# Patient Record
Sex: MALE | Race: WHITE | URBAN - NONMETROPOLITAN AREA
[De-identification: names, ages, dates, MRNs, and addresses within clinical notes are randomized per-mention and may not be internally consistent; named-entity substitution may affect disease eponyms.]

---
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
## 2023-01-09 ENCOUNTER — APPOINTMENT (RX ONLY)
Dept: URBAN - NONMETROPOLITAN AREA CLINIC 9 | Facility: CLINIC | Age: 18
Setting detail: DERMATOLOGY
End: 2023-01-09

## 2023-01-09 DIAGNOSIS — D485 NEOPLASM OF UNCERTAIN BEHAVIOR OF SKIN: ICD-10-CM

## 2023-01-09 PROBLEM — D48.5 NEOPLASM OF UNCERTAIN BEHAVIOR OF SKIN: Status: ACTIVE | Noted: 2023-01-09

## 2023-01-09 PROCEDURE — ? PUNCH EXCISION

## 2023-01-09 PROCEDURE — 11400 EXC TR-EXT B9+MARG 0.5 CM<: CPT

## 2023-01-09 ASSESSMENT — LOCATION ZONE DERM: LOCATION ZONE: TRUNK

## 2023-01-09 ASSESSMENT — LOCATION DETAILED DESCRIPTION DERM: LOCATION DETAILED: LEFT SUPERIOR MEDIAL LOWER BACK

## 2023-01-09 ASSESSMENT — LOCATION SIMPLE DESCRIPTION DERM: LOCATION SIMPLE: LEFT LOWER BACK

## 2023-01-09 NOTE — PROCEDURE: PUNCH EXCISION
Medical Necessity Clause: This procedure was medically necessary because the lesion that was treated was:
Detail Level: Detailed
Size Of Lesion (*Required): 0.4
X Size Of Lesion Width In Cm (Optional): 0
Punch Size In Mm: 4
Repair Type: None (Simple)
Complex Requirements: Extensive Undermining Performed?: No
Undermining Type: Entire Wound
Debridement Text: The wound edges were debrided prior to proceeding with the closure to facilitate wound healing.
Helical Rim Text: The closure involved the helical rim.
Vermilion Border Text: The closure involved the vermilion border.
Nostril Rim Text: The closure involved the nostril rim.
Retention Suture Text: Retention sutures were placed to support the closure and prevent dehiscence.
Include Undermining Statement In The Repair Note?: Yes
Anesthesia Type: 1% lidocaine with 1:100,000 epinephrine and a 1:10 solution of 8.4% sodium bicarbonate
Anesthesia Volume In Cc: 1
Hemostasis: Pressure
Epidermal Sutures: 4-0 Nylon
Epidermal Closure: simple interrupted
Wound Care: Petrolatum
Wound Dressings: a dressing with hypoallergenic tape
Suture Removal: 7 days
Lab: [id number]
Lab Facility: [id number]
Path Notes (To The Dermatopathologist): Please check margins.
1.5 Mm Punch Excision Text: A 1.5 mm punch was used to excise the lesion to the level of the subcutaneous fat.  Blunt dissection was used to free the lesion from the surrounding tissues and the lesion was removed.
2 Mm Punch Excision Text: A 2 mm punch was used to excise the lesion to the level of the subcutaneous fat.  Blunt dissection was used to free the lesion from the surrounding tissues and the lesion was removed.
2.5 Mm Punch Excision Text: A 2.5 mm punch was used to excise the lesion to the level of the subcutaneous fat.  Blunt dissection was used to free the lesion from the surrounding tissues and the lesion was removed.
3 Mm Punch Excision Text: A 3 mm punch was used to excise the lesion to the level of the subcutaneous fat.  Blunt dissection was used to free the lesion from the surrounding tissues and the lesion was removed.
3.5 Mm Punch Excision Text: A 3.5 mm punch was used to excise the lesion to the level of the subcutaneous fat.  Blunt dissection was used to free the lesion from the surrounding tissues and the lesion was removed.
4 Mm Punch Excision Text: A 4 mm punch was used to excise the lesion to the level of the subcutaneous fat.  Blunt dissection was used to free the lesion from the surrounding tissues and the lesion was removed.
4.5 Mm Punch Excision Text: A 4.5 mm punch was used to excise the lesion to the level of the subcutaneous fat.  Blunt dissection was used to free the lesion from the surrounding tissues and the lesion was removed.
5 Mm Punch Excision Text: A 5 mm punch was used to excise the lesion to the level of the subcutaneous fat.  Blunt dissection was used to free the lesion from the surrounding tissues and the lesion was removed.
6 Mm Punch Excision Text: A 6 mm punch was used to excise the lesion to the level of the subcutaneous fat.  Blunt dissection was used to free the lesion from the surrounding tissues and the lesion was removed.
7 Mm Punch Excision Text: A 7 mm punch was used to excise the lesion to the level of the subcutaneous fat.  Blunt dissection was used to free the lesion from the surrounding tissues and the lesion was removed.
8 Mm Punch Excision Text: A 8 mm punch was used to excise the lesion to the level of the subcutaneous fat.  Blunt dissection was used to free the lesion from the surrounding tissues and the lesion was removed.
10 Mm Punch Excision Text: A 10 mm punch was used to excise the lesion to the level of the subcutaneous fat.  Blunt dissection was used to free the lesion from the surrounding tissues and the lesion was removed.
12 Mm Punch Excision Text: A 12 mm punch was used to excise the lesion to the level of the subcutaneous fat.  Blunt dissection was used to free the lesion from the surrounding tissues and the lesion was removed.
Consent was obtained from the patient. The risks and benefits to therapy were discussed in detail. Specifically, the risks of infection, scarring, bleeding, prolonged wound healing, incomplete removal, allergy to anesthesia, nerve injury and recurrence were addressed. Prior to the procedure, the treatment site was clearly identified and confirmed by the patient. All components of Universal Protocol/PAUSE Rule completed.
Post-Care Instructions: I reviewed with the patient in detail post-care instructions. Patient is to cleanse the area twice daily with hydrogen peroxide and/or diluted vinegar, then apply Vaseline and coverage with bandage.
Notification Instructions: Patient will return to clinic for path results.
Billing Type: Third-Party Bill

## 2023-01-16 ENCOUNTER — APPOINTMENT (RX ONLY)
Dept: URBAN - NONMETROPOLITAN AREA CLINIC 9 | Facility: CLINIC | Age: 18
Setting detail: DERMATOLOGY
End: 2023-01-16

## 2023-01-16 DIAGNOSIS — L70.0 ACNE VULGARIS: ICD-10-CM

## 2023-01-16 DIAGNOSIS — Z48.817 ENCOUNTER FOR SURGICAL AFTERCARE FOLLOWING SURGERY ON THE SKIN AND SUBCUTANEOUS TISSUE: ICD-10-CM

## 2023-01-16 PROCEDURE — ? SEPARATE AND IDENTIFIABLE DOCUMENTATION

## 2023-01-16 PROCEDURE — ? COUNSELING

## 2023-01-16 PROCEDURE — ? INVENTORY

## 2023-01-16 PROCEDURE — 99024 POSTOP FOLLOW-UP VISIT: CPT

## 2023-01-16 PROCEDURE — ? IN-HOUSE DISPENSING PHARMACY

## 2023-01-16 PROCEDURE — ? POST-OP WOUND EVALUATION

## 2023-01-16 PROCEDURE — 99213 OFFICE O/P EST LOW 20 MIN: CPT | Mod: 24

## 2023-01-16 PROCEDURE — ? PRESCRIPTION

## 2023-01-16 PROCEDURE — ? TREATMENT REGIMEN

## 2023-01-16 RX ORDER — MINOCYCLINE HYDROCHLORIDE 100 MG/1
CAPSULE ORAL
Qty: 60 | Refills: 1 | Status: ERX

## 2023-01-16 ASSESSMENT — LOCATION SIMPLE DESCRIPTION DERM
LOCATION SIMPLE: LEFT SHOULDER
LOCATION SIMPLE: RIGHT SHOULDER
LOCATION SIMPLE: RIGHT CHEEK
LOCATION SIMPLE: LEFT CHEEK
LOCATION SIMPLE: LEFT LOWER BACK
LOCATION SIMPLE: CHIN

## 2023-01-16 ASSESSMENT — LOCATION DETAILED DESCRIPTION DERM
LOCATION DETAILED: RIGHT CENTRAL MALAR CHEEK
LOCATION DETAILED: LEFT LATERAL MALAR CHEEK
LOCATION DETAILED: LEFT CHIN
LOCATION DETAILED: LEFT ANTERIOR SHOULDER
LOCATION DETAILED: RIGHT ANTERIOR SHOULDER
LOCATION DETAILED: LEFT SUPERIOR MEDIAL LOWER BACK

## 2023-01-16 ASSESSMENT — LOCATION ZONE DERM
LOCATION ZONE: FACE
LOCATION ZONE: ARM
LOCATION ZONE: TRUNK

## 2023-01-16 NOTE — PROCEDURE: COUNSELING
Bactrim Pregnancy And Lactation Text: This medication is Pregnancy Category D and is known to cause fetal risk.  It is also excreted in breast milk.
Erythromycin Counseling:  I discussed with the patient the risks of erythromycin including but not limited to GI upset, allergic reaction, drug rash, diarrhea, increase in liver enzymes, and yeast infections.
Aklief counseling:  Patient advised to apply a pea-sized amount only at bedtime and wait 30 minutes after washing their face before applying.  If too drying, patient may add a non-comedogenic moisturizer.  The most commonly reported side effects including irritation, redness, scaling, dryness, stinging, burning, itching, and increased risk of sunburn.  The patient verbalized understanding of the proper use and possible adverse effects of retinoids.  All of the patient's questions and concerns were addressed.
Winlevi Counseling:  I discussed with the patient the risks of topical clascoterone including but not limited to erythema, scaling, itching, and stinging. Patient voiced their understanding.
Topical Retinoid Pregnancy And Lactation Text: This medication is Pregnancy Category C. It is unknown if this medication is excreted in breast milk.
Tazorac Pregnancy And Lactation Text: This medication is not safe during pregnancy. It is unknown if this medication is excreted in breast milk.
Include Pregnancy/Lactation Warning?: No
Birth Control Pills Pregnancy And Lactation Text: This medication should be avoided if pregnant and for the first 30 days post-partum.
Isotretinoin Counseling: Patient should get monthly blood tests, not donate blood, not drive at night if vision affected, not share medication, and not undergo elective surgery for 6 months after tx completed. Side effects reviewed, pt to contact office should one occur.
Sarecycline Pregnancy And Lactation Text: This medication is Pregnancy Category D and not consider safe during pregnancy. It is also excreted in breast milk.
Azelaic Acid Counseling: Patient counseled that medicine may cause skin irritation and to avoid applying near the eyes.  In the event of skin irritation, the patient was advised to reduce the amount of the drug applied or use it less frequently.   The patient verbalized understanding of the proper use and possible adverse effects of azelaic acid.  All of the patient's questions and concerns were addressed.
Isotretinoin Pregnancy And Lactation Text: This medication is Pregnancy Category X and is considered extremely dangerous during pregnancy. It is unknown if it is excreted in breast milk.
Spironolactone Counseling: Patient advised regarding risks of diarrhea, abdominal pain, hyperkalemia, birth defects (for female patients), liver toxicity and renal toxicity. The patient may need blood work to monitor liver and kidney function and potassium levels while on therapy. The patient verbalized understanding of the proper use and possible adverse effects of spironolactone.  All of the patient's questions and concerns were addressed.
Azelaic Acid Pregnancy And Lactation Text: This medication is considered safe during pregnancy and breast feeding.
Topical Clindamycin Counseling: Patient counseled that this medication may cause skin irritation or allergic reactions.  In the event of skin irritation, the patient was advised to reduce the amount of the drug applied or use it less frequently.   The patient verbalized understanding of the proper use and possible adverse effects of clindamycin.  All of the patient's questions and concerns were addressed.
Dapsone Counseling: I discussed with the patient the risks of dapsone including but not limited to hemolytic anemia, agranulocytosis, rashes, methemoglobinemia, kidney failure, peripheral neuropathy, headaches, GI upset, and liver toxicity.  Patients who start dapsone require monitoring including baseline LFTs and weekly CBCs for the first month, then every month thereafter.  The patient verbalized understanding of the proper use and possible adverse effects of dapsone.  All of the patient's questions and concerns were addressed.
Topical Sulfur Applications Counseling: Topical Sulfur Counseling: Patient counseled that this medication may cause skin irritation or allergic reactions.  In the event of skin irritation, the patient was advised to reduce the amount of the drug applied or use it less frequently.   The patient verbalized understanding of the proper use and possible adverse effects of topical sulfur application.  All of the patient's questions and concerns were addressed.
Benzoyl Peroxide Pregnancy And Lactation Text: This medication is Pregnancy Category C. It is unknown if benzoyl peroxide is excreted in breast milk.
Azithromycin Pregnancy And Lactation Text: This medication is considered safe during pregnancy and is also secreted in breast milk.
Doxycycline Counseling:  Patient counseled regarding possible photosensitivity and increased risk for sunburn.  Patient instructed to avoid sunlight, if possible.  When exposed to sunlight, patients should wear protective clothing, sunglasses, and sunscreen.  The patient was instructed to call the office immediately if the following severe adverse effects occur:  hearing changes, easy bruising/bleeding, severe headache, or vision changes.  The patient verbalized understanding of the proper use and possible adverse effects of doxycycline.  All of the patient's questions and concerns were addressed.
High Dose Vitamin A Pregnancy And Lactation Text: High dose vitamin A therapy is contraindicated during pregnancy and breast feeding.
Tetracycline Counseling: Patient counseled regarding possible photosensitivity and increased risk for sunburn.  Patient instructed to avoid sunlight, if possible.  When exposed to sunlight, patients should wear protective clothing, sunglasses, and sunscreen.  The patient was instructed to call the office immediately if the following severe adverse effects occur:  hearing changes, easy bruising/bleeding, severe headache, or vision changes.  The patient verbalized understanding of the proper use and possible adverse effects of tetracycline.  All of the patient's questions and concerns were addressed. Patient understands to avoid pregnancy while on therapy due to potential birth defects.
Erythromycin Pregnancy And Lactation Text: This medication is Pregnancy Category B and is considered safe during pregnancy. It is also excreted in breast milk.
Sarecycline Counseling: Patient advised regarding possible photosensitivity and discoloration of the teeth, skin, lips, tongue and gums.  Patient instructed to avoid sunlight, if possible.  When exposed to sunlight, patients should wear protective clothing, sunglasses, and sunscreen.  The patient was instructed to call the office immediately if the following severe adverse effects occur:  hearing changes, easy bruising/bleeding, severe headache, or vision changes.  The patient verbalized understanding of the proper use and possible adverse effects of sarecycline.  All of the patient's questions and concerns were addressed.
Aklief Pregnancy And Lactation Text: It is unknown if this medication is safe to use during pregnancy.  It is unknown if this medication is excreted in breast milk.  Breastfeeding women should use the topical cream on the smallest area of the skin for the shortest time needed while breastfeeding.  Do not apply to nipple and areola.
Tazorac Counseling:  Patient advised that medication is irritating and drying.  Patient may need to apply sparingly and wash off after an hour before eventually leaving it on overnight.  The patient verbalized understanding of the proper use and possible adverse effects of tazorac.  All of the patient's questions and concerns were addressed.
Doxycycline Pregnancy And Lactation Text: This medication is Pregnancy Category D and not consider safe during pregnancy. It is also excreted in breast milk but is considered safe for shorter treatment courses.
Bactrim Counseling:  I discussed with the patient the risks of sulfa antibiotics including but not limited to GI upset, allergic reaction, drug rash, diarrhea, dizziness, photosensitivity, and yeast infections.  Rarely, more serious reactions can occur including but not limited to aplastic anemia, agranulocytosis, methemoglobinemia, blood dyscrasias, liver or kidney failure, lung infiltrates or desquamative/blistering drug rashes.
Minocycline Counseling: Patient advised regarding possible photosensitivity and discoloration of the teeth, skin, lips, tongue and gums.  Patient instructed to avoid sunlight, if possible.  When exposed to sunlight, patients should wear protective clothing, sunglasses, and sunscreen.  The patient was instructed to call the office immediately if the following severe adverse effects occur:  hearing changes, easy bruising/bleeding, severe headache, or vision changes.  The patient verbalized understanding of the proper use and possible adverse effects of minocycline.  All of the patient's questions and concerns were addressed.
Topical Retinoid counseling:  Patient advised to apply a pea-sized amount only at bedtime and wait 30 minutes after washing their face before applying.  If too drying, patient may add a non-comedogenic moisturizer. The patient verbalized understanding of the proper use and possible adverse effects of retinoids.  All of the patient's questions and concerns were addressed.
Birth Control Pills Counseling: Birth Control Pill Counseling: I discussed with the patient the potential side effects of OCPs including but not limited to increased risk of stroke, heart attack, thrombophlebitis, deep venous thrombosis, hepatic adenomas, breast changes, GI upset, headaches, and depression.  The patient verbalized understanding of the proper use and possible adverse effects of OCPs. All of the patient's questions and concerns were addressed.
Winlevi Pregnancy And Lactation Text: This medication is considered safe during pregnancy and breastfeeding.
Azithromycin Counseling:  I discussed with the patient the risks of azithromycin including but not limited to GI upset, allergic reaction, drug rash, diarrhea, and yeast infections.
Dapsone Pregnancy And Lactation Text: This medication is Pregnancy Category C and is not considered safe during pregnancy or breast feeding.
High Dose Vitamin A Counseling: Side effects reviewed, pt to contact office should one occur.
Spironolactone Pregnancy And Lactation Text: This medication can cause feminization of the male fetus and should be avoided during pregnancy. The active metabolite is also found in breast milk.
Topical Sulfur Applications Pregnancy And Lactation Text: This medication is Pregnancy Category C and has an unknown safety profile during pregnancy. It is unknown if this topical medication is excreted in breast milk.
Topical Clindamycin Pregnancy And Lactation Text: This medication is Pregnancy Category B and is considered safe during pregnancy. It is unknown if it is excreted in breast milk.
Benzoyl Peroxide Counseling: Patient counseled that medicine may cause skin irritation and bleach clothing.  In the event of skin irritation, the patient was advised to reduce the amount of the drug applied or use it less frequently.   The patient verbalized understanding of the proper use and possible adverse effects of benzoyl peroxide.  All of the patient's questions and concerns were addressed.
Detail Level: Detailed

## 2023-01-16 NOTE — PROCEDURE: POST-OP WOUND EVALUATION
Detail Level: Detailed
Add 26319 Cpt? (Important Note: In 2017 The Use Of 19575 Is Being Tracked By Cms To Determine Future Global Period Reimbursement For Global Periods): yes
Wound Diameter In Cm(Optional): 0
Wound Crusting?: clean
Sutures?: intact
Wound Edema?: mild
Wound Color?: pink
Lymphadenopathy?: absent
Any New Or Residual Neoplasm?: No
Follow Up Units (Optional): 4
Follow Up Time Frame (Optional): months

## 2023-01-16 NOTE — PROCEDURE: IN-HOUSE DISPENSING PHARMACY
Product 26 Units Dispensed: 0
Name Of Product 41: Alopecia Topical Solution for Men HP\\n(Minoxidil 7%, Finasteride 0.1%)
Product 78 Unit Type: mg
Product 3 Amount/Unit (Numbers Only): 30
Product 26 Application Directions: Apply sparingly to all affected areas as directed 1-2 times per day.
Product 22 Price/Unit (In Dollars): 50
Product 24 Application Directions: Patient is to mix with 14 ounces of Skin MD.\\nApply sparingly topically to the affected areas 5-10 times per week, taper as directed.
Product 41 Application Directions: Apply topically to the affected areas 1 or 2 times daily or as directed.
Detail Level: Zone
Product 12 Amount/Unit (Numbers Only): 15
Name Of Product 31: Wart Corn and Callus Cream\\n(Cimetidine 10%, Lidocaine 5%, Salicylic Acid 40%)
Product 26 Unit Type: grams
Send Charges To Patient Encounter: No
Product 22 Amount/Unit (Numbers Only): 60
Name Of Product 25: Dermatitis Foam\\n(Clobetasol Propionate 0.05%, Calcipotriene 0.005%)
Name Of Product 11: Anti-Fungal Cream\\n(Ciclopirox 3%, Itraconazole 5%, Urea 20%, DMSO 5%)
Product 31 Application Directions: Apply topically to the affected areas 1-2 times daily as directed.
Name Of Product 23: Dermatitis Topical Solution\\n(Clobetasol Propionate 0.05%, Niacinamide 4%
Product 2 Amount/Unit (Numbers Only): 120
Product 25 Application Directions: Apply sparingly topically to the affected areas 1-2 times daily, taper as directed.
Product 23 Application Directions: Apply sparingly topically to the affected areas 3 times per week, taper as directed.
Name Of Product 26: Psoriasis Cream\\n(Calcipotriene 0.005%, Niacinamide 4%)
Name Of Product 24: Dermatitis Topical Solution\\n(Clobetasol Propionate 0.05%, Niacinamide 4%)
Product 12 Application Directions: Apply topically to the affected nail(s) and surrounding areas twice daily as directed.
Name Of Product 22: Dermatitis Cream\\n(Clobetasol Propionate 0.05%, Niacinamide 4%)
Product 3 Application Directions: Apply sparingly topically to the affected areas 1-2 times daily as directed.
Name Of Product 2: Acne Body Wash\\n(Salicylic Acid 5%, Sodium Sulfacetamide Monohydrate 10%, Sulfur Precipitated 2.75%)
Product 22 Application Directions: Apply sparingly topically to the affected areas 1-2 times a day M-F, taper as directed.
Render Refills If Set To 0: Yes
Product 2 Application Directions: Wash affected areas daily or as directed.
Name Of Product 21: Dermatitis Cream\\n(Fluocinolone Acetonide 0.01%, Niacinamide 4%)
Name Of Product 32: Actinic Keratosis Gel / Wart Gel\\n(Imiquimod 5%, Niacinamide 2%, Levocetirizine Dihydrochloride 1%)
Name Of Product 1: Acne Gel\\n(Adapalene 0.3%, Benzoyl Peroxide 2.5%, Niacinamide 4%)
Product 1 Units Dispensed: 1
Name Of Product 3: Rosacea Silicone Gel\\n(Metronidazole 1%, Ivermectin 1%, Potassium Azeloyl Diglycinate 5%, Niacinamide 4%)
Name Of Product 12: Anti-Fungal Nail Solution\\n(Fluconazole 4%, Ibuprofen 2%, Itraconazole 1%, Terbinafine HCL 4%)
Product 32 Application Directions: Apply sparingly topically to the affected areas daily as directed.
Product 1 Application Directions: Apply sparingly topically to acne affected zones daily as directed.

## 2023-02-27 ENCOUNTER — APPOINTMENT (RX ONLY)
Dept: URBAN - NONMETROPOLITAN AREA CLINIC 9 | Facility: CLINIC | Age: 18
Setting detail: DERMATOLOGY
End: 2023-02-27

## 2023-02-27 DIAGNOSIS — L70.0 ACNE VULGARIS: ICD-10-CM

## 2023-02-27 PROCEDURE — 99213 OFFICE O/P EST LOW 20 MIN: CPT

## 2023-02-27 PROCEDURE — ? INVENTORY

## 2023-02-27 PROCEDURE — ? PRESCRIPTION

## 2023-02-27 PROCEDURE — ? IN-HOUSE DISPENSING PHARMACY

## 2023-02-27 PROCEDURE — ? COUNSELING

## 2023-02-27 PROCEDURE — ? TREATMENT REGIMEN

## 2023-02-27 RX ORDER — MINOCYCLINE HYDROCHLORIDE 100 MG/1
CAPSULE ORAL
Qty: 60 | Refills: 3 | Status: ERX

## 2023-02-27 ASSESSMENT — LOCATION DETAILED DESCRIPTION DERM
LOCATION DETAILED: RIGHT ANTERIOR SHOULDER
LOCATION DETAILED: LEFT ANTERIOR SHOULDER
LOCATION DETAILED: LEFT LATERAL MALAR CHEEK
LOCATION DETAILED: RIGHT CENTRAL MALAR CHEEK
LOCATION DETAILED: LEFT CHIN

## 2023-02-27 ASSESSMENT — LOCATION SIMPLE DESCRIPTION DERM
LOCATION SIMPLE: RIGHT CHEEK
LOCATION SIMPLE: LEFT CHEEK
LOCATION SIMPLE: CHIN
LOCATION SIMPLE: LEFT SHOULDER
LOCATION SIMPLE: RIGHT SHOULDER

## 2023-02-27 ASSESSMENT — LOCATION ZONE DERM
LOCATION ZONE: ARM
LOCATION ZONE: FACE

## 2023-02-27 NOTE — PROCEDURE: IN-HOUSE DISPENSING PHARMACY
Product 26 Units Dispensed: 0
Name Of Product 41: Alopecia Topical Solution for Men HP\\n(Minoxidil 7%, Finasteride 0.1%)
Product 78 Unit Type: mg
Product 3 Amount/Unit (Numbers Only): 30
Product 26 Application Directions: Apply sparingly to all affected areas as directed 1-2 times per day.
Product 22 Price/Unit (In Dollars): 50
Product 24 Application Directions: Patient is to mix with 14 ounces of Skin MD.\\nApply sparingly topically to the affected areas 5-10 times per week, taper as directed.
Product 41 Application Directions: Apply topically to the affected areas 1 or 2 times daily or as directed.
Detail Level: Zone
Product 12 Amount/Unit (Numbers Only): 15
Name Of Product 31: Wart Corn and Callus Cream\\n(Cimetidine 10%, Lidocaine 5%, Salicylic Acid 40%)
Product 26 Unit Type: grams
Send Charges To Patient Encounter: No
Product 22 Amount/Unit (Numbers Only): 60
Name Of Product 25: Dermatitis Foam\\n(Clobetasol Propionate 0.05%, Calcipotriene 0.005%)
Name Of Product 11: Anti-Fungal Cream\\n(Ciclopirox 3%, Itraconazole 5%, Urea 20%, DMSO 5%)
Product 31 Application Directions: Apply topically to the affected areas 1-2 times daily as directed.
Name Of Product 23: Dermatitis Topical Solution\\n(Clobetasol Propionate 0.05%, Niacinamide 4%
Product 2 Amount/Unit (Numbers Only): 120
Product 25 Application Directions: Apply sparingly topically to the affected areas 1-2 times daily, taper as directed.
Product 23 Application Directions: Apply sparingly topically to the affected areas 3 times per week, taper as directed.
Name Of Product 26: Psoriasis Cream\\n(Calcipotriene 0.005%, Niacinamide 4%)
Name Of Product 24: Dermatitis Topical Solution\\n(Clobetasol Propionate 0.05%, Niacinamide 4%)
Product 12 Application Directions: Apply topically to the affected nail(s) and surrounding areas twice daily as directed.
Name Of Product 22: Dermatitis Cream\\n(Clobetasol Propionate 0.05%, Niacinamide 4%)
Product 3 Application Directions: Apply sparingly topically to the affected areas 1-2 times daily as directed.
Name Of Product 2: Acne Body Wash\\n(Salicylic Acid 5%, Sodium Sulfacetamide Monohydrate 10%, Sulfur Precipitated 2.75%)
Product 2 Units Dispensed: 1
Product 22 Application Directions: Apply sparingly topically to the affected areas 1-2 times a day M-F, taper as directed.
Render Refills If Set To 0: Yes
Product 2 Application Directions: Wash affected areas daily or as directed.
Name Of Product 21: Dermatitis Cream\\n(Fluocinolone Acetonide 0.01%, Niacinamide 4%)
Name Of Product 32: Actinic Keratosis Gel / Wart Gel\\n(Imiquimod 5%, Niacinamide 2%, Levocetirizine Dihydrochloride 1%)
Name Of Product 1: Acne Gel\\n(Adapalene 0.3%, Benzoyl Peroxide 2.5%, Niacinamide 4%)
Name Of Product 3: Rosacea Silicone Gel\\n(Metronidazole 1%, Ivermectin 1%, Potassium Azeloyl Diglycinate 5%, Niacinamide 4%)
Name Of Product 12: Anti-Fungal Nail Solution\\n(Fluconazole 4%, Ibuprofen 2%, Itraconazole 1%, Terbinafine HCL 4%)
Product 32 Application Directions: Apply sparingly topically to the affected areas daily as directed.
Product 1 Application Directions: Apply sparingly topically to acne affected zones daily as directed.

## 2023-04-18 ENCOUNTER — APPOINTMENT (RX ONLY)
Dept: URBAN - NONMETROPOLITAN AREA CLINIC 9 | Facility: CLINIC | Age: 18
Setting detail: DERMATOLOGY
End: 2023-04-18

## 2023-04-18 DIAGNOSIS — L70.0 ACNE VULGARIS: ICD-10-CM

## 2023-04-18 PROCEDURE — ? IN-HOUSE DISPENSING PHARMACY

## 2023-04-18 PROCEDURE — ? INVENTORY

## 2023-04-18 NOTE — PROCEDURE: IN-HOUSE DISPENSING PHARMACY
Product 71 Units Dispensed: 0
Product 75 Unit Type: mg
Product 26 Unit Type: grams
Render Refills If Set To 0: Yes
Product 24 Amount/Unit (Numbers Only): 60
Product 3 Price/Unit (In Dollars): 50
Product 22 Application Directions: Apply sparingly topically to the affected areas 1-2 times a day M-F, taper as directed.
Name Of Product 1: Acne Gel\\n(Adapalene 0.3%, Benzoyl Peroxide 2.5%, Niacinamide 4%)
Product 12 Amount/Unit (Numbers Only): 15
Product 3 Amount/Unit (Numbers Only): 30
Product 1 Application Directions: Apply sparingly topically to acne affected zones daily as directed.
Name Of Product 23: Dermatitis Topical Solution\\n(Clobetasol Propionate 0.05%, Niacinamide 4%
Name Of Product 21: Dermatitis Cream\\n(Fluocinolone Acetonide 0.01%, Niacinamide 4%)
Name Of Product 41: Alopecia Topical Solution for Men HP\\n(Minoxidil 7%, Finasteride 0.1%)
Name Of Product 32: Actinic Keratosis Gel / Wart Gel\\n(Imiquimod 5%, Niacinamide 2%, Levocetirizine Dihydrochloride 1%)
Name Of Product 26: Psoriasis Cream\\n(Calcipotriene 0.005%, Niacinamide 4%)
Detail Level: Zone
Send Charges To Patient Encounter: No
Product 41 Application Directions: Apply topically to the affected areas 1 or 2 times daily or as directed.
Product 32 Application Directions: Apply sparingly topically to the affected areas daily as directed.
Product 2 Amount/Unit (Numbers Only): 120
Name Of Product 31: Wart Corn and Callus Cream\\n(Cimetidine 10%, Lidocaine 5%, Salicylic Acid 40%)
Product 26 Application Directions: Apply sparingly to all affected areas as directed 1-2 times per day.
Name Of Product 22: Dermatitis Cream\\n(Clobetasol Propionate 0.05%, Niacinamide 4%)
Product 24 Application Directions: Patient is to mix with 14 ounces of Skin MD.\\nApply sparingly topically to the affected areas 5-10 times per week, taper as directed.
Name Of Product 12: Anti-Fungal Nail Solution\\n(Fluconazole 4%, Ibuprofen 2%, Itraconazole 1%, Terbinafine HCL 4%)
Name Of Product 3: Rosacea Silicone Gel\\n(Metronidazole 1%, Ivermectin 1%, Potassium Azeloyl Diglycinate 5%, Niacinamide 4%)
Product 12 Application Directions: Apply topically to the affected nail(s) and surrounding areas twice daily as directed.
Name Of Product 25: Dermatitis Foam\\n(Clobetasol Propionate 0.05%, Calcipotriene 0.005%)
Product 3 Application Directions: Apply sparingly topically to the affected areas 1-2 times daily as directed.
Product 31 Application Directions: Apply topically to the affected areas 1-2 times daily as directed.
Product 25 Application Directions: Apply sparingly topically to the affected areas 1-2 times daily, taper as directed.
Name Of Product 11: Anti-Fungal Cream\\n(Ciclopirox 3%, Itraconazole 5%, Urea 20%, DMSO 5%)
Product 23 Application Directions: Apply sparingly topically to the affected areas 3 times per week, taper as directed.
Name Of Product 2: Acne Body Wash\\n(Salicylic Acid 5%, Sodium Sulfacetamide Monohydrate 10%, Sulfur Precipitated 2.75%)
Product 2 Units Dispensed: 1
Name Of Product 24: Dermatitis Topical Solution\\n(Clobetasol Propionate 0.05%, Niacinamide 4%)
Product 2 Application Directions: Wash affected areas daily or as directed.

## 2023-07-14 ENCOUNTER — APPOINTMENT (RX ONLY)
Dept: URBAN - NONMETROPOLITAN AREA CLINIC 9 | Facility: CLINIC | Age: 18
Setting detail: DERMATOLOGY
End: 2023-07-14

## 2023-07-14 DIAGNOSIS — L70.0 ACNE VULGARIS: ICD-10-CM

## 2023-07-14 PROCEDURE — ? TREATMENT REGIMEN

## 2023-07-14 PROCEDURE — ? PRESCRIPTION

## 2023-07-14 PROCEDURE — 99213 OFFICE O/P EST LOW 20 MIN: CPT

## 2023-07-14 PROCEDURE — ? INVENTORY

## 2023-07-14 PROCEDURE — ? IN-HOUSE DISPENSING PHARMACY

## 2023-07-14 PROCEDURE — ? COUNSELING

## 2023-07-14 RX ORDER — MINOCYCLINE HYDROCHLORIDE 100 MG/1
CAPSULE ORAL
Qty: 60 | Refills: 5 | Status: ERX

## 2023-07-14 ASSESSMENT — LOCATION SIMPLE DESCRIPTION DERM
LOCATION SIMPLE: RIGHT SHOULDER
LOCATION SIMPLE: RIGHT CHEEK
LOCATION SIMPLE: LEFT CHEEK
LOCATION SIMPLE: CHIN
LOCATION SIMPLE: LEFT SHOULDER

## 2023-07-14 ASSESSMENT — LOCATION ZONE DERM
LOCATION ZONE: FACE
LOCATION ZONE: ARM

## 2023-07-14 ASSESSMENT — LOCATION DETAILED DESCRIPTION DERM
LOCATION DETAILED: RIGHT ANTERIOR SHOULDER
LOCATION DETAILED: LEFT CHIN
LOCATION DETAILED: RIGHT CENTRAL MALAR CHEEK
LOCATION DETAILED: LEFT ANTERIOR SHOULDER
LOCATION DETAILED: LEFT LATERAL MALAR CHEEK

## 2023-07-28 ENCOUNTER — APPOINTMENT (RX ONLY)
Dept: URBAN - NONMETROPOLITAN AREA CLINIC 9 | Facility: CLINIC | Age: 18
Setting detail: DERMATOLOGY
End: 2023-07-28

## 2023-07-28 DIAGNOSIS — L70.0 ACNE VULGARIS: ICD-10-CM

## 2023-07-28 PROCEDURE — ? IN-HOUSE DISPENSING PHARMACY

## 2023-07-28 PROCEDURE — ? INVENTORY

## 2023-07-28 NOTE — PROCEDURE: IN-HOUSE DISPENSING PHARMACY
Product 72 Refills: 0
Product 56 Unit Type: mg
Detail Level: Zone
Product 25 Application Directions: Apply sparingly topically to the affected areas 1-2 times daily, taper as directed.
Product 2 Price/Unit (In Dollars): 50
Product 31 Unit Type: grams
Name Of Product 21: Dermatitis Cream\\n(Fluocinolone Acetonide 0.01%, Niacinamide 4%)
Name Of Product 32: Actinic Keratosis Gel / Wart Gel\\n(Imiquimod 5%, Niacinamide 2%, Levocetirizine Dihydrochloride 1%)
Product 2 Amount/Unit (Numbers Only): 120
Send Charges To Patient Encounter: No
Product 11 Amount/Unit (Numbers Only): 30
Product 23 Amount/Unit (Numbers Only): 60
Product 21 Application Directions: Apply sparingly topically to the affected areas 1-2 times a day M-F, taper as directed.
Name Of Product 26: Psoriasis Cream\\n(Calcipotriene 0.005%, Niacinamide 4%)
Render Refills If Set To 0: Yes
Product 32 Application Directions: Apply sparingly topically to the affected areas daily as directed.
Name Of Product 1: Acne Gel\\n(Adapalene 0.3%, Benzoyl Peroxide 2.5%, Niacinamide 4%)
Product 26 Application Directions: Apply sparingly to all affected areas as directed 1-2 times per day.
Product 3 Application Directions: Apply sparingly topically to the affected areas 1-2 times daily as directed.
Product 24 Application Directions: Patient is to mix with 14 ounces of Skin MD.\\nApply sparingly topically to the affected areas 5-10 times per week, taper as directed.
Product 12 Application Directions: Apply topically to the affected nail(s) and surrounding areas twice daily as directed.
Name Of Product 31: Wart Corn and Callus Cream\\n(Cimetidine 10%, Lidocaine 5%, Salicylic Acid 40%)
Name Of Product 25: Dermatitis Foam\\n(Clobetasol Propionate 0.05%, Calcipotriene 0.005%)
Product 31 Application Directions: Apply topically to the affected areas 1-2 times daily as directed.
Product 2 Units Dispensed: 1
Product 23 Application Directions: Apply sparingly topically to the affected areas 3 times per week, taper as directed.
Product 2 Application Directions: Wash affected areas daily or as directed.
Name Of Product 41: Alopecia Topical Solution for Men HP\\n(Minoxidil 7%, Finasteride 0.1%)
Name Of Product 12: Anti-Fungal Nail Solution\\n(Fluconazole 4%, Ibuprofen 2%, Itraconazole 1%, Terbinafine HCL 4%)
Name Of Product 24: Dermatitis Topical Solution\\n(Clobetasol Propionate 0.05%, Niacinamide 4%)
Name Of Product 3: Rosacea Silicone Gel\\n(Metronidazole 1%, Ivermectin 1%, Potassium Azeloyl Diglycinate 5%, Niacinamide 4%)
Product 41 Application Directions: Apply topically to the affected areas 1 or 2 times daily or as directed.
Name Of Product 22: Dermatitis Cream\\n(Clobetasol Propionate 0.05%, Niacinamide 4%)
Product 12 Amount/Unit (Numbers Only): 15
Product 1 Application Directions: Apply sparingly topically to acne affected zones daily as directed.
Name Of Product 11: Anti-Fungal Cream\\n(Ciclopirox 3%, Itraconazole 5%, Urea 20%, DMSO 5%)
Name Of Product 23: Dermatitis Topical Solution\\n(Clobetasol Propionate 0.05%, Niacinamide 4%
Name Of Product 2: Acne Body Wash\\n(Salicylic Acid 5%, Sodium Sulfacetamide Monohydrate 10%, Sulfur Precipitated 2.75%)

## 2023-08-18 ENCOUNTER — APPOINTMENT (RX ONLY)
Dept: URBAN - NONMETROPOLITAN AREA CLINIC 9 | Facility: CLINIC | Age: 18
Setting detail: DERMATOLOGY
End: 2023-08-18

## 2023-08-18 DIAGNOSIS — L70.0 ACNE VULGARIS: ICD-10-CM

## 2023-08-18 PROCEDURE — ? IN-HOUSE DISPENSING PHARMACY

## 2023-08-18 PROCEDURE — ? INVENTORY

## 2023-08-18 NOTE — PROCEDURE: IN-HOUSE DISPENSING PHARMACY
Product 52 Unit Type: mg
Product 7 Price/Unit (In Dollars): 0
Product 41 Unit Type: grams
Name Of Product 25: Dermatitis Foam\\n(Clobetasol Propionate 0.05%, Calcipotriene 0.005%)
Name Of Product 2: Acne Body Wash\\n(Salicylic Acid 5%, Sodium Sulfacetamide Monohydrate 10%, Sulfur Precipitated 2.75%)
Product 32 Price/Unit (In Dollars): 50
Product 2 Units Dispensed: 1
Product 25 Application Directions: Apply sparingly topically to the affected areas 1-2 times daily, taper as directed.
Name Of Product 21: Dermatitis Cream\\n(Fluocinolone Acetonide 0.01%, Niacinamide 4%)
Product 2 Application Directions: Wash affected areas daily or as directed.
Product 32 Amount/Unit (Numbers Only): 30
Product 12 Amount/Unit (Numbers Only): 15
Product 23 Amount/Unit (Numbers Only): 60
Name Of Product 26: Psoriasis Cream\\n(Calcipotriene 0.005%, Niacinamide 4%)
Name Of Product 3: Rosacea Silicone Gel\\n(Metronidazole 1%, Ivermectin 1%, Potassium Azeloyl Diglycinate 5%, Niacinamide 4%)
Product 21 Application Directions: Apply sparingly topically to the affected areas 1-2 times a day M-F, taper as directed.
Name Of Product 24: Dermatitis Topical Solution\\n(Clobetasol Propionate 0.05%, Niacinamide 4%)
Name Of Product 1: Acne Gel\\n(Adapalene 0.3%, Benzoyl Peroxide 2.5%, Niacinamide 4%)
Name Of Product 41: Alopecia Topical Solution for Men HP\\n(Minoxidil 7%, Finasteride 0.1%)
Product 24 Application Directions: Patient is to mix with 14 ounces of Skin MD.\\nApply sparingly topically to the affected areas 5-10 times per week, taper as directed.
Product 1 Application Directions: Apply sparingly topically to acne affected zones daily as directed.
Product 41 Application Directions: Apply topically to the affected areas 1 or 2 times daily or as directed.
Name Of Product 32: Actinic Keratosis Gel / Wart Gel\\n(Imiquimod 5%, Niacinamide 2%, Levocetirizine Dihydrochloride 1%)
Name Of Product 12: Anti-Fungal Nail Solution\\n(Fluconazole 4%, Ibuprofen 2%, Itraconazole 1%, Terbinafine HCL 4%)
Detail Level: Zone
Name Of Product 23: Dermatitis Topical Solution\\n(Clobetasol Propionate 0.05%, Niacinamide 4%
Product 32 Application Directions: Apply sparingly topically to the affected areas daily as directed.
Send Charges To Patient Encounter: No
Product 12 Application Directions: Apply topically to the affected nail(s) and surrounding areas twice daily as directed.
Product 2 Amount/Unit (Numbers Only): 120
Product 23 Application Directions: Apply sparingly topically to the affected areas 3 times per week, taper as directed.
Render Refills If Set To 0: Yes
Name Of Product 31: Wart Corn and Callus Cream\\n(Cimetidine 10%, Lidocaine 5%, Salicylic Acid 40%)
Product 26 Application Directions: Apply sparingly to all affected areas as directed 1-2 times per day.
Product 3 Application Directions: Apply sparingly topically to the affected areas 1-2 times daily as directed.
Name Of Product 11: Anti-Fungal Cream\\n(Ciclopirox 3%, Itraconazole 5%, Urea 20%, DMSO 5%)
Name Of Product 22: Dermatitis Cream\\n(Clobetasol Propionate 0.05%, Niacinamide 4%)
Product 31 Application Directions: Apply topically to the affected areas 1-2 times daily as directed.

## 2023-09-15 ENCOUNTER — APPOINTMENT (RX ONLY)
Dept: URBAN - NONMETROPOLITAN AREA CLINIC 9 | Facility: CLINIC | Age: 18
Setting detail: DERMATOLOGY
End: 2023-09-15

## 2023-09-15 DIAGNOSIS — L70.0 ACNE VULGARIS: ICD-10-CM

## 2023-09-15 PROCEDURE — ? INVENTORY

## 2023-09-15 PROCEDURE — ? IN-HOUSE DISPENSING PHARMACY

## 2023-09-15 NOTE — PROCEDURE: IN-HOUSE DISPENSING PHARMACY
Product 59 Price/Unit (In Dollars): 0
Product 24 Application Directions: Patient is to mix with 14 ounces of Skin MD.\\nApply sparingly topically to the affected areas 5-10 times per week, taper as directed.
Product 22 Price/Unit (In Dollars): 50
Product 56 Unit Type: mg
Name Of Product 3: Rosacea Silicone Gel\\n(Metronidazole 1%, Ivermectin 1%, Potassium Azeloyl Diglycinate 5%, Niacinamide 4%)
Product 26 Amount/Unit (Numbers Only): 30
Product 12 Amount/Unit (Numbers Only): 15
Name Of Product 12: Anti-Fungal Nail Solution\\n(Fluconazole 4%, Ibuprofen 2%, Itraconazole 1%, Terbinafine HCL 4%)
Product 2 Unit Type: grams
Name Of Product 24: Dermatitis Topical Solution\\n(Clobetasol Propionate 0.05%, Niacinamide 4%)
Product 2 Application Directions: Wash affected areas daily or as directed.
Product 41 Amount/Unit (Numbers Only): 60
Product 11 Application Directions: Apply sparingly topically to the affected areas 1-2 times daily as directed.
Product 2 Units Dispensed: 1
Product 23 Application Directions: Apply sparingly topically to the affected areas 3 times per week, taper as directed.
Name Of Product 2: Acne Body Wash\\n(Salicylic Acid 5%, Sodium Sulfacetamide Monohydrate 10%, Sulfur Precipitated 2.75%)
Name Of Product 21: Dermatitis Cream\\n(Fluocinolone Acetonide 0.01%, Niacinamide 4%)
Name Of Product 32: Actinic Keratosis Gel / Wart Gel\\n(Imiquimod 5%, Niacinamide 2%, Levocetirizine Dihydrochloride 1%)
Product 25 Application Directions: Apply sparingly topically to the affected areas 1-2 times daily, taper as directed.
Name Of Product 41: Alopecia Topical Solution for Men HP\\n(Minoxidil 7%, Finasteride 0.1%)
Product 31 Application Directions: Apply topically to the affected areas 1-2 times daily as directed.
Name Of Product 25: Dermatitis Foam\\n(Clobetasol Propionate 0.05%, Calcipotriene 0.005%)
Name Of Product 31: Wart Corn and Callus Cream\\n(Cimetidine 10%, Lidocaine 5%, Salicylic Acid 40%)
Render Refills If Set To 0: Yes
Name Of Product 22: Dermatitis Cream\\n(Clobetasol Propionate 0.05%, Niacinamide 4%)
Product 26 Application Directions: Apply sparingly to all affected areas as directed 1-2 times per day.
Render Product Pricing In Note: No
Product 12 Application Directions: Apply topically to the affected nail(s) and surrounding areas twice daily as directed.
Product 2 Amount/Unit (Numbers Only): 120
Product 32 Application Directions: Apply sparingly topically to the affected areas daily as directed.
Product 21 Application Directions: Apply sparingly topically to the affected areas 1-2 times a day M-F, taper as directed.
Name Of Product 26: Psoriasis Cream\\n(Calcipotriene 0.005%, Niacinamide 4%)
Product 41 Application Directions: Apply topically to the affected areas 1 or 2 times daily or as directed.
Detail Level: Zone
Name Of Product 11: Anti-Fungal Cream\\n(Ciclopirox 3%, Itraconazole 5%, Urea 20%, DMSO 5%)
Name Of Product 23: Dermatitis Topical Solution\\n(Clobetasol Propionate 0.05%, Niacinamide 4%
Product 1 Application Directions: Apply sparingly topically to acne affected zones daily as directed.
Name Of Product 1: Acne Gel\\n(Adapalene 0.3%, Benzoyl Peroxide 2.5%, Niacinamide 4%)

## 2023-10-17 ENCOUNTER — APPOINTMENT (RX ONLY)
Dept: URBAN - NONMETROPOLITAN AREA CLINIC 9 | Facility: CLINIC | Age: 18
Setting detail: DERMATOLOGY
End: 2023-10-17

## 2023-10-17 DIAGNOSIS — L70.0 ACNE VULGARIS: ICD-10-CM

## 2023-10-17 PROCEDURE — ? IN-HOUSE DISPENSING PHARMACY

## 2023-10-17 PROCEDURE — ? INVENTORY

## 2023-10-17 NOTE — PROCEDURE: IN-HOUSE DISPENSING PHARMACY
Product 34 Units Dispensed: 0
Name Of Product 25: Dermatitis Foam\\n(Clobetasol Propionate 0.05%, Calcipotriene 0.005%)
Product 7 Unit Type: mg
Product 1 Application Directions: Apply sparingly topically to acne affected zones daily as directed.
Product 32 Price/Unit (In Dollars): 50
Product 1 Unit Type: grams
Product 25 Application Directions: Apply sparingly topically to the affected areas 1-2 times daily, taper as directed.
Name Of Product 41: Alopecia Topical Solution for Men HP\\n(Minoxidil 7%, Finasteride 0.1%)
Name Of Product 2: Acne Body Wash\\n(Salicylic Acid 5%, Sodium Sulfacetamide Monohydrate 10%, Sulfur Precipitated 2.75%)
Product 32 Amount/Unit (Numbers Only): 30
Product 2 Units Dispensed: 1
Product 41 Application Directions: Apply topically to the affected areas 1 or 2 times daily or as directed.
Product 23 Amount/Unit (Numbers Only): 60
Name Of Product 26: Psoriasis Cream\\n(Calcipotriene 0.005%, Niacinamide 4%)
Product 21 Application Directions: Apply sparingly topically to the affected areas 1-2 times a day M-F, taper as directed.
Product 2 Application Directions: Wash affected areas daily or as directed.
Send Charges To Patient Encounter: No
Name Of Product 21: Dermatitis Cream\\n(Fluocinolone Acetonide 0.01%, Niacinamide 4%)
Product 2 Amount/Unit (Numbers Only): 120
Name Of Product 24: Dermatitis Topical Solution\\n(Clobetasol Propionate 0.05%, Niacinamide 4%)
Name Of Product 12: Anti-Fungal Nail Solution\\n(Fluconazole 4%, Ibuprofen 2%, Itraconazole 1%, Terbinafine HCL 4%)
Render Refills If Set To 0: Yes
Product 24 Application Directions: Patient is to mix with 14 ounces of Skin MD.\\nApply sparingly topically to the affected areas 5-10 times per week, taper as directed.
Product 12 Application Directions: Apply topically to the affected nail(s) and surrounding areas twice daily as directed.
Name Of Product 1: Acne Gel\\n(Adapalene 0.3%, Benzoyl Peroxide 2.5%, Niacinamide 4%)
Name Of Product 32: Actinic Keratosis Gel / Wart Gel\\n(Imiquimod 5%, Niacinamide 2%, Levocetirizine Dihydrochloride 1%)
Name Of Product 23: Dermatitis Topical Solution\\n(Clobetasol Propionate 0.05%, Niacinamide 4%
Name Of Product 11: Anti-Fungal Cream\\n(Ciclopirox 3%, Itraconazole 5%, Urea 20%, DMSO 5%)
Product 32 Application Directions: Apply sparingly topically to the affected areas daily as directed.
Product 23 Application Directions: Apply sparingly topically to the affected areas 3 times per week, taper as directed.
Product 11 Application Directions: Apply sparingly topically to the affected areas 1-2 times daily as directed.
Detail Level: Zone
Name Of Product 31: Wart Corn and Callus Cream\\n(Cimetidine 10%, Lidocaine 5%, Salicylic Acid 40%)
Product 26 Application Directions: Apply sparingly to all affected areas as directed 1-2 times per day.
Name Of Product 22: Dermatitis Cream\\n(Clobetasol Propionate 0.05%, Niacinamide 4%)
Name Of Product 3: Rosacea Silicone Gel\\n(Metronidazole 1%, Ivermectin 1%, Potassium Azeloyl Diglycinate 5%, Niacinamide 4%)
Product 31 Application Directions: Apply topically to the affected areas 1-2 times daily as directed.
Product 12 Amount/Unit (Numbers Only): 15

## 2023-11-17 ENCOUNTER — APPOINTMENT (RX ONLY)
Dept: URBAN - NONMETROPOLITAN AREA CLINIC 9 | Facility: CLINIC | Age: 18
Setting detail: DERMATOLOGY
End: 2023-11-17

## 2023-11-17 DIAGNOSIS — L70.0 ACNE VULGARIS: ICD-10-CM

## 2023-11-17 PROCEDURE — ? IN-HOUSE DISPENSING PHARMACY

## 2023-11-17 PROCEDURE — ? INVENTORY

## 2023-11-17 NOTE — PROCEDURE: IN-HOUSE DISPENSING PHARMACY
Product 22 Unit Type: grams
Product 72 Units Dispensed: 0
Product 32 Application Directions: Apply sparingly topically to the affected areas daily as directed.
Product 64 Unit Type: mg
Product 2 Price/Unit (In Dollars): 50
Name Of Product 12: Anti-Fungal Nail Solution\\n(Fluconazole 4%, Ibuprofen 2%, Itraconazole 1%, Terbinafine HCL 4%)
Detail Level: Zone
Name Of Product 23: Dermatitis Topical Solution\\n(Clobetasol Propionate 0.05%, Niacinamide 4%
Name Of Product 3: Rosacea Silicone Gel\\n(Metronidazole 1%, Ivermectin 1%, Potassium Azeloyl Diglycinate 5%, Niacinamide 4%)
Send Charges To Patient Encounter: No
Product 2 Amount/Unit (Numbers Only): 120
Product 25 Amount/Unit (Numbers Only): 60
Product 12 Application Directions: Apply topically to the affected nail(s) and surrounding areas twice daily as directed.
Product 23 Application Directions: Apply sparingly topically to the affected areas 3 times per week, taper as directed.
Product 3 Application Directions: Apply sparingly topically to the affected areas 1-2 times daily as directed.
Name Of Product 41: Alopecia Topical Solution for Men HP\\n(Minoxidil 7%, Finasteride 0.1%)
Render Refills If Set To 0: Yes
Product 31 Application Directions: Apply topically to the affected areas 1-2 times daily as directed.
Product 26 Application Directions: Apply sparingly to all affected areas as directed 1-2 times per day.
Name Of Product 11: Anti-Fungal Cream\\n(Ciclopirox 3%, Itraconazole 5%, Urea 20%, DMSO 5%)
Name Of Product 22: Dermatitis Cream\\n(Clobetasol Propionate 0.05%, Niacinamide 4%)
Name Of Product 32: Actinic Keratosis Gel / Wart Gel\\n(Imiquimod 5%, Niacinamide 2%, Levocetirizine Dihydrochloride 1%)
Product 1 Amount/Unit (Numbers Only): 30
Product 22 Application Directions: Apply sparingly topically to the affected areas 1-2 times a day M-F, taper as directed.
Name Of Product 2: Acne Body Wash\\n(Salicylic Acid 5%, Sodium Sulfacetamide Monohydrate 10%, Sulfur Precipitated 2.75%)
Product 2 Units Dispensed: 1
Product 2 Application Directions: Wash affected areas daily or as directed.
Product 25 Application Directions: Apply sparingly topically to the affected areas 1-2 times daily, taper as directed.
Name Of Product 21: Dermatitis Cream\\n(Fluocinolone Acetonide 0.01%, Niacinamide 4%)
Name Of Product 31: Wart Corn and Callus Cream\\n(Cimetidine 10%, Lidocaine 5%, Salicylic Acid 40%)
Product 12 Amount/Unit (Numbers Only): 15
Name Of Product 26: Psoriasis Cream\\n(Calcipotriene 0.005%, Niacinamide 4%)
Name Of Product 1: Acne Gel\\n(Adapalene 0.3%, Benzoyl Peroxide 2.5%, Niacinamide 4%)
Name Of Product 24: Dermatitis Topical Solution\\n(Clobetasol Propionate 0.05%, Niacinamide 4%)
Product 41 Application Directions: Apply topically to the affected areas 1 or 2 times daily or as directed.
Product 24 Application Directions: Patient is to mix with 14 ounces of Skin MD.\\nApply sparingly topically to the affected areas 5-10 times per week, taper as directed.
Product 1 Application Directions: Apply sparingly topically to acne affected zones daily as directed.
Name Of Product 25: Dermatitis Foam\\n(Clobetasol Propionate 0.05%, Calcipotriene 0.005%)

## 2023-12-01 ENCOUNTER — APPOINTMENT (RX ONLY)
Dept: URBAN - NONMETROPOLITAN AREA CLINIC 9 | Facility: CLINIC | Age: 18
Setting detail: DERMATOLOGY
End: 2023-12-01

## 2023-12-01 DIAGNOSIS — L70.0 ACNE VULGARIS: ICD-10-CM

## 2023-12-01 PROCEDURE — ? INVENTORY

## 2023-12-01 PROCEDURE — ? IN-HOUSE DISPENSING PHARMACY

## 2023-12-01 NOTE — PROCEDURE: IN-HOUSE DISPENSING PHARMACY
Product 50 Refills: 0
Product 68 Unit Type: mg
Product 21 Unit Type: grams
Product 31 Price/Unit (In Dollars): 50
Name Of Product 22: Dermatitis Cream\\n(Clobetasol Propionate 0.05%, Niacinamide 4%)
Name Of Product 3: Rosacea Silicone Gel\\n(Metronidazole 1%, Ivermectin 1%, Potassium Azeloyl Diglycinate 5%, Niacinamide 4%)
Product 26 Application Directions: Apply sparingly to all affected areas as directed 1-2 times per day.
Product 31 Amount/Unit (Numbers Only): 30
Product 24 Amount/Unit (Numbers Only): 60
Product 22 Application Directions: Apply sparingly topically to the affected areas 1-2 times a day M-F, taper as directed.
Product 3 Application Directions: Apply sparingly topically to the affected areas 1-2 times daily as directed.
Product 1 Application Directions: Apply sparingly topically to acne affected zones daily as directed.
Name Of Product 12: Anti-Fungal Nail Solution\\n(Fluconazole 4%, Ibuprofen 2%, Itraconazole 1%, Terbinafine HCL 4%)
Name Of Product 41: Alopecia Topical Solution for Men HP\\n(Minoxidil 7%, Finasteride 0.1%)
Product 32 Application Directions: Apply sparingly topically to the affected areas daily as directed.
Name Of Product 21: Dermatitis Cream\\n(Fluocinolone Acetonide 0.01%, Niacinamide 4%)
Product 25 Application Directions: Apply sparingly topically to the affected areas 1-2 times daily, taper as directed.
Name Of Product 2: Acne Body Wash\\n(Salicylic Acid 5%, Sodium Sulfacetamide Monohydrate 10%, Sulfur Precipitated 2.75%)
Product 12 Application Directions: Apply topically to the affected nail(s) and surrounding areas twice daily as directed.
Name Of Product 11: Anti-Fungal Cream\\n(Ciclopirox 3%, Itraconazole 5%, Urea 20%, DMSO 5%)
Product 2 Units Dispensed: 1
Product 41 Application Directions: Apply topically to the affected areas 1 or 2 times daily or as directed.
Name Of Product 26: Psoriasis Cream\\n(Calcipotriene 0.005%, Niacinamide 4%)
Product 2 Application Directions: Wash affected areas daily or as directed.
Name Of Product 24: Dermatitis Topical Solution\\n(Clobetasol Propionate 0.05%, Niacinamide 4%)
Render Product Pricing In Note: No
Render Refills If Set To 0: Yes
Product 31 Application Directions: Apply topically to the affected areas 1-2 times daily as directed.
Product 24 Application Directions: Patient is to mix with 14 ounces of Skin MD.\\nApply sparingly topically to the affected areas 5-10 times per week, taper as directed.
Name Of Product 1: Acne Gel\\n(Adapalene 0.3%, Benzoyl Peroxide 2.5%, Niacinamide 4%)
Name Of Product 32: Actinic Keratosis Gel / Wart Gel\\n(Imiquimod 5%, Niacinamide 2%, Levocetirizine Dihydrochloride 1%)
Name Of Product 25: Dermatitis Foam\\n(Clobetasol Propionate 0.05%, Calcipotriene 0.005%)
Name Of Product 23: Dermatitis Topical Solution\\n(Clobetasol Propionate 0.05%, Niacinamide 4%
Product 23 Application Directions: Apply sparingly topically to the affected areas 3 times per week, taper as directed.
Product 12 Amount/Unit (Numbers Only): 15
Detail Level: Zone
Name Of Product 31: Wart Corn and Callus Cream\\n(Cimetidine 10%, Lidocaine 5%, Salicylic Acid 40%)
Product 2 Amount/Unit (Numbers Only): 120

## 2024-01-30 ENCOUNTER — APPOINTMENT (RX ONLY)
Dept: URBAN - NONMETROPOLITAN AREA CLINIC 9 | Facility: CLINIC | Age: 19
Setting detail: DERMATOLOGY
End: 2024-01-30

## 2024-01-30 DIAGNOSIS — L70.0 ACNE VULGARIS: ICD-10-CM

## 2024-01-30 PROCEDURE — 99213 OFFICE O/P EST LOW 20 MIN: CPT

## 2024-01-30 PROCEDURE — ? TREATMENT REGIMEN

## 2024-01-30 PROCEDURE — ? COUNSELING

## 2024-01-30 PROCEDURE — ? INVENTORY

## 2024-01-30 PROCEDURE — ? IN-HOUSE DISPENSING PHARMACY

## 2024-01-30 PROCEDURE — ? PRESCRIPTION

## 2024-01-30 RX ORDER — MINOCYCLINE HYDROCHLORIDE 100 MG/1
CAPSULE ORAL
Qty: 60 | Refills: 5 | Status: ERX

## 2024-01-30 ASSESSMENT — LOCATION SIMPLE DESCRIPTION DERM
LOCATION SIMPLE: RIGHT CHEEK
LOCATION SIMPLE: LEFT SHOULDER
LOCATION SIMPLE: CHIN
LOCATION SIMPLE: RIGHT SHOULDER
LOCATION SIMPLE: LEFT CHEEK

## 2024-01-30 ASSESSMENT — LOCATION ZONE DERM
LOCATION ZONE: FACE
LOCATION ZONE: ARM

## 2024-01-30 ASSESSMENT — LOCATION DETAILED DESCRIPTION DERM
LOCATION DETAILED: RIGHT ANTERIOR SHOULDER
LOCATION DETAILED: LEFT CHIN
LOCATION DETAILED: LEFT LATERAL MALAR CHEEK
LOCATION DETAILED: RIGHT CENTRAL MALAR CHEEK
LOCATION DETAILED: LEFT ANTERIOR SHOULDER

## 2024-01-30 NOTE — PROCEDURE: IN-HOUSE DISPENSING PHARMACY
Product 63 Amount/Unit (Numbers Only): 0
Product 59 Unit Type: mg
Product 23 Application Directions: Apply sparingly topically to the affected areas 3 times per week, taper as directed.
Name Of Product 2: Acne Body Wash\\n(Salicylic Acid 5%, Sodium Sulfacetamide Monohydrate 10%, Sulfur Precipitated 2.75%)
Name Of Product 11: Anti-Fungal Cream\\n(Ciclopirox 3%, Itraconazole 5%, Urea 20%, DMSO 5%)
Product 32 Price/Unit (In Dollars): 50
Name Of Product 51: AHA + BHA Peel Pads
Product 21 Amount/Unit (Numbers Only): 30
Product 2 Units Dispensed: 1
Product 23 Unit Type: grams
Product 2 Application Directions: Wash affected areas daily or as directed.
Name Of Product 24: Dermatitis Topical Solution\\n(Clobetasol Propionate 0.05%, Niacinamide 4%)
Product 11 Application Directions: Apply sparingly topically to the affected areas 1-2 times daily as directed.
Product 41 Amount/Unit (Numbers Only): 60
Product 51 Application Directions: Brighten the skin and exfoliate. For anti-aging or acne prone skin. Use 2-4 nights a week after cleansing and before serums/creams. Rinse off.
Name Of Product 3: Rosacea Silicone Gel\\n(Metronidazole 1%, Ivermectin 1%, Potassium Azeloyl Diglycinate 5%, Niacinamide 4%)
Product 24 Application Directions: Patient is to mix with 14 ounces of Skin MD.\\nApply sparingly topically to the affected areas 5-10 times per week, taper as directed.
Product 12 Amount/Unit (Numbers Only): 15
Name Of Product 12: Anti-Fungal Nail Solution\\n(Fluconazole 4%, Ibuprofen 2%, Itraconazole 1%, Terbinafine HCL 4%)
Name Of Product 54: Hyaluronic Acid Gel
Product 22 Application Directions: Apply sparingly topically to the affected areas 1-2 times a day M-F, taper as directed.
Name Of Product 1: Acne Gel\\n(Adapalene 0.3%, Benzoyl Peroxide 2.5%, Niacinamide 4%)
Product 54 Application Directions: Oil-free moisturizing gel. Good for plumping skin with wrinkles, crepiness and uneven texture. Use daily, morning or evening.
Name Of Product 23: Dermatitis Topical Solution\\n(Clobetasol Propionate 0.05%, Niacinamide 4%
Product 1 Application Directions: Apply sparingly topically to acne affected zones daily as directed.
Product 54 Unit Type: bottle(s)
Name Of Product 41: Alopecia Topical Solution for Men HP\\n(Minoxidil 7%, Finasteride 0.1%)
Name Of Product 32: Actinic Keratosis Gel / Wart Gel\\n(Imiquimod 5%, Niacinamide 2%, Levocetirizine Dihydrochloride 1%)
Name Of Product 26: Psoriasis Cream\\n(Calcipotriene 0.005%, Niacinamide 4%)
Name Of Product 53: Damon
Detail Level: Zone
Send Charges To Patient Encounter: No
Product 41 Application Directions: Apply topically to the affected areas 1 or 2 times daily or as directed.
Product 32 Application Directions: Apply sparingly topically to the affected areas daily as directed.
Product 2 Amount/Unit (Numbers Only): 120
Product 53 Application Directions: Improves the appearance of dark spots, melasma, hyperpigmentation, sun damage. Use daily, morning or evening.
Name Of Product 22: Dermatitis Cream\\n(Clobetasol Propionate 0.05%, Niacinamide 4%)
Product 26 Application Directions: Apply sparingly to all affected areas as directed 1-2 times per day.
Product 12 Application Directions: Apply topically to the affected nail(s) and surrounding areas twice daily as directed.
Render Refills If Set To 0: Yes
Product 51 Unit Type: unit(s)
Name Of Product 31: Wart Corn and Callus Cream\\n(Cimetidine 10%, Lidocaine 5%, Salicylic Acid 40%)
Name Of Product 25: Dermatitis Foam\\n(Clobetasol Propionate 0.05%, Calcipotriene 0.005%)
Name Of Product 52: Antioxidant C Serum
Product 31 Application Directions: Apply topically to the affected areas 1-2 times daily as directed.
Product 52 Application Directions: Brightens the skin adding a healthy glow. Protects from pollution and environmental damage. Use daily, morning or evening.
Name Of Product 21: Dermatitis Cream\\n(Fluocinolone Acetonide 0.01%, Niacinamide 4%)
Product 25 Application Directions: Apply sparingly topically to the affected areas 1-2 times daily, taper as directed.

## 2024-03-01 ENCOUNTER — APPOINTMENT (RX ONLY)
Dept: URBAN - NONMETROPOLITAN AREA CLINIC 9 | Facility: CLINIC | Age: 19
Setting detail: DERMATOLOGY
End: 2024-03-01

## 2024-03-01 DIAGNOSIS — L70.0 ACNE VULGARIS: ICD-10-CM

## 2024-03-01 PROCEDURE — ? INVENTORY

## 2024-03-01 PROCEDURE — ? IN-HOUSE DISPENSING PHARMACY

## 2024-03-01 NOTE — PROCEDURE: IN-HOUSE DISPENSING PHARMACY
Product 32 Amount/Unit (Numbers Only): 30
Product 41 Units Dispensed: 0
Product 25 Unit Type: grams
Product 62 Unit Type: mg
Product 53 Application Directions: Improves the appearance of dark spots, melasma, hyperpigmentation, sun damage. Use daily, morning or evening.
Product 23 Amount/Unit (Numbers Only): 60
Product 41 Application Directions: Apply topically to the affected areas 1 or 2 times daily or as directed.
Name Of Product 26: Psoriasis Cream\\n(Calcipotriene 0.005%, Niacinamide 4%)
Name Of Product 3: Rosacea Silicone Gel\\n(Metronidazole 1%, Ivermectin 1%, Potassium Azeloyl Diglycinate 5%, Niacinamide 4%)
Product 53 Unit Type: bottle(s)
Product 21 Application Directions: Apply sparingly topically to the affected areas 1-2 times a day M-F, taper as directed.
Name Of Product 51: AHA + BHA Peel Pads
Name Of Product 54: Hyaluronic Acid Gel
Name Of Product 31: Wart Corn and Callus Cream\\n(Cimetidine 10%, Lidocaine 5%, Salicylic Acid 40%)
Product 21 Price/Unit (In Dollars): 50
Product 26 Application Directions: Apply sparingly to all affected areas as directed 1-2 times per day.
Product 3 Application Directions: Apply sparingly topically to the affected areas 1-2 times daily as directed.
Name Of Product 22: Dermatitis Cream\\n(Clobetasol Propionate 0.05%, Niacinamide 4%)
Product 51 Application Directions: Brighten the skin and exfoliate. For anti-aging or acne prone skin. Use 2-4 nights a week after cleansing and before serums/creams. Rinse off.
Product 24 Application Directions: Patient is to mix with 14 ounces of Skin MD.\\nApply sparingly topically to the affected areas 5-10 times per week, taper as directed.
Product 1 Application Directions: Apply sparingly topically to acne affected zones daily as directed.
Product 54 Amount/Unit (Numbers Only): 1
Product 12 Application Directions: Apply topically to the affected nail(s) and surrounding areas twice daily as directed.
Name Of Product 25: Dermatitis Foam\\n(Clobetasol Propionate 0.05%, Calcipotriene 0.005%)
Name Of Product 2: Acne Body Wash\\n(Salicylic Acid 5%, Sodium Sulfacetamide Monohydrate 10%, Sulfur Precipitated 2.75%)
Name Of Product 41: Alopecia Topical Solution for Men HP\\n(Minoxidil 7%, Finasteride 0.1%)
Product 25 Application Directions: Apply sparingly topically to the affected areas 1-2 times daily, taper as directed.
Product 2 Application Directions: Wash affected areas daily or as directed.
Send Charges To Patient Encounter: No
Product 2 Amount/Unit (Numbers Only): 120
Product 23 Application Directions: Apply sparingly topically to the affected areas 3 times per week, taper as directed.
Name Of Product 53: Damon
Render Refills If Set To 0: Yes
Name Of Product 21: Dermatitis Cream\\n(Fluocinolone Acetonide 0.01%, Niacinamide 4%)
Name Of Product 24: Dermatitis Topical Solution\\n(Clobetasol Propionate 0.05%, Niacinamide 4%)
Name Of Product 1: Acne Gel\\n(Adapalene 0.3%, Benzoyl Peroxide 2.5%, Niacinamide 4%)
Name Of Product 12: Anti-Fungal Nail Solution\\n(Fluconazole 4%, Ibuprofen 2%, Itraconazole 1%, Terbinafine HCL 4%)
Product 54 Application Directions: Oil-free moisturizing gel. Good for plumping skin with wrinkles, crepiness and uneven texture. Use daily, morning or evening.
Product 31 Application Directions: Apply topically to the affected areas 1-2 times daily as directed.
Product 51 Unit Type: unit(s)
Product 12 Amount/Unit (Numbers Only): 15
Name Of Product 52: Antioxidant C Serum
Name Of Product 32: Actinic Keratosis Gel / Wart Gel\\n(Imiquimod 5%, Niacinamide 2%, Levocetirizine Dihydrochloride 1%)
Name Of Product 23: Dermatitis Topical Solution\\n(Clobetasol Propionate 0.05%, Niacinamide 4%
Product 52 Application Directions: Brightens the skin adding a healthy glow. Protects from pollution and environmental damage. Use daily, morning or evening.
Detail Level: Zone
Name Of Product 11: Anti-Fungal Cream\\n(Ciclopirox 3%, Itraconazole 5%, Urea 20%, DMSO 5%)
Product 32 Application Directions: Apply sparingly topically to the affected areas daily as directed.

## 2024-03-27 ENCOUNTER — APPOINTMENT (RX ONLY)
Dept: URBAN - NONMETROPOLITAN AREA CLINIC 9 | Facility: CLINIC | Age: 19
Setting detail: DERMATOLOGY
End: 2024-03-27

## 2024-03-27 DIAGNOSIS — L70.0 ACNE VULGARIS: ICD-10-CM

## 2024-03-27 PROCEDURE — ? INVENTORY

## 2024-03-27 PROCEDURE — ? IN-HOUSE DISPENSING PHARMACY

## 2024-03-27 NOTE — PROCEDURE: IN-HOUSE DISPENSING PHARMACY
Product 75 Price/Unit (In Dollars): 0
Product 64 Unit Type: mg
Name Of Product 22: Dermatitis Cream\\n(Clobetasol Propionate 0.05%, Niacinamide 4%)
Product 3 Price/Unit (In Dollars): 50
Product 26 Application Directions: Apply sparingly to all affected areas as directed 1-2 times per day.
Product 52 Unit Type: bottle(s)
Product 31 Application Directions: Apply topically to the affected areas 1-2 times daily as directed.
Name Of Product 53: Damon
Product 31 Unit Type: grams
Product 22 Application Directions: Apply sparingly topically to the affected areas 1-2 times a day M-F, taper as directed.
Product 24 Amount/Unit (Numbers Only): 60
Product 3 Amount/Unit (Numbers Only): 30
Name Of Product 12: Anti-Fungal Nail Solution\\n(Fluconazole 4%, Ibuprofen 2%, Itraconazole 1%, Terbinafine HCL 4%)
Name Of Product 32: Actinic Keratosis Gel / Wart Gel\\n(Imiquimod 5%, Niacinamide 2%, Levocetirizine Dihydrochloride 1%)
Product 53 Application Directions: Improves the appearance of dark spots, melasma, hyperpigmentation, sun damage. Use daily, morning or evening.
Name Of Product 23: Dermatitis Topical Solution\\n(Clobetasol Propionate 0.05%, Niacinamide 4%
Product 51 Application Directions: Brighten the skin and exfoliate. For anti-aging or acne prone skin. Use 2-4 nights a week after cleansing and before serums/creams. Rinse off.
Name Of Product 21: Dermatitis Cream\\n(Fluocinolone Acetonide 0.01%, Niacinamide 4%)
Product 41 Application Directions: Apply topically to the affected areas 1 or 2 times daily or as directed.
Name Of Product 11: Anti-Fungal Cream\\n(Ciclopirox 3%, Itraconazole 5%, Urea 20%, DMSO 5%)
Product 25 Application Directions: Apply sparingly topically to the affected areas 1-2 times daily, taper as directed.
Product 51 Unit Type: unit(s)
Product 12 Amount/Unit (Numbers Only): 15
Product 54 Amount/Unit (Numbers Only): 1
Name Of Product 52: Antioxidant C Serum
Name Of Product 26: Psoriasis Cream\\n(Calcipotriene 0.005%, Niacinamide 4%)
Product 2 Amount/Unit (Numbers Only): 120
Product 52 Application Directions: Brightens the skin adding a healthy glow. Protects from pollution and environmental damage. Use daily, morning or evening.
Name Of Product 3: Rosacea Silicone Gel\\n(Metronidazole 1%, Ivermectin 1%, Potassium Azeloyl Diglycinate 5%, Niacinamide 4%)
Name Of Product 1: Acne Gel\\n(Adapalene 0.3%, Benzoyl Peroxide 2.5%, Niacinamide 4%)
Product 3 Application Directions: Apply sparingly topically to the affected areas 1-2 times daily as directed.
Product 24 Application Directions: Patient is to mix with 14 ounces of Skin MD.\\nApply sparingly topically to the affected areas 5-10 times per week, taper as directed.
Product 1 Application Directions: Apply sparingly topically to acne affected zones daily as directed.
Name Of Product 51: AHA + BHA Peel Pads
Name Of Product 25: Dermatitis Foam\\n(Clobetasol Propionate 0.05%, Calcipotriene 0.005%)
Name Of Product 41: Alopecia Topical Solution for Men HP\\n(Minoxidil 7%, Finasteride 0.1%)
Name Of Product 2: Acne Body Wash\\n(Salicylic Acid 5%, Sodium Sulfacetamide Monohydrate 10%, Sulfur Precipitated 2.75%)
Product 32 Application Directions: Apply sparingly topically to the affected areas daily as directed.
Product 12 Application Directions: Apply topically to the affected nail(s) and surrounding areas twice daily as directed.
Name Of Product 54: Hyaluronic Acid Gel
Detail Level: Zone
Product 23 Application Directions: Apply sparingly topically to the affected areas 3 times per week, taper as directed.
Send Charges To Patient Encounter: No
Product 2 Application Directions: Wash affected areas daily or as directed.
Product 54 Application Directions: Oil-free moisturizing gel. Good for plumping skin with wrinkles, crepiness and uneven texture. Use daily, morning or evening.
Name Of Product 24: Dermatitis Topical Solution\\n(Clobetasol Propionate 0.05%, Niacinamide 4%)
Name Of Product 31: Wart Corn and Callus Cream\\n(Cimetidine 10%, Lidocaine 5%, Salicylic Acid 40%)
Render Refills If Set To 0: Yes
Yes

## 2024-04-24 ENCOUNTER — APPOINTMENT (RX ONLY)
Dept: URBAN - NONMETROPOLITAN AREA CLINIC 9 | Facility: CLINIC | Age: 19
Setting detail: DERMATOLOGY
End: 2024-04-24

## 2024-04-24 DIAGNOSIS — L70.0 ACNE VULGARIS: ICD-10-CM

## 2024-04-24 PROCEDURE — ? INVENTORY

## 2024-04-24 PROCEDURE — ? IN-HOUSE DISPENSING PHARMACY

## 2024-04-24 NOTE — PROCEDURE: IN-HOUSE DISPENSING PHARMACY
Name Of Product 52: Antioxidant C Serum
Name Of Product 32: Actinic Keratosis Gel / Wart Gel\\n(Imiquimod 5%, Niacinamide 2%, Levocetirizine Dihydrochloride 1%)
Product 20 Units Dispensed: 0
Product 45 Unit Type: mg
Product 22 Amount/Unit (Numbers Only): 60
Product 2 Amount/Unit (Numbers Only): 120
Name Of Product 25: Dermatitis Foam\\n(Clobetasol Propionate 0.05%, Calcipotriene 0.005%)
Render Product Pricing In Note: No
Product 54 Amount/Unit (Numbers Only): 1
Product 41 Application Directions: Apply topically to the affected areas 1 or 2 times daily or as directed.
Product 12 Price/Unit (In Dollars): 50
Product 32 Application Directions: Apply sparingly topically to the affected areas daily as directed.
Product 52 Application Directions: Brightens the skin adding a healthy glow. Protects from pollution and environmental damage. Use daily, morning or evening.
Render Refills If Set To 0: Yes
Product 41 Unit Type: grams
Product 25 Application Directions: Apply sparingly topically to the affected areas 1-2 times daily, taper as directed.
Name Of Product 1: Acne Gel\\n(Adapalene 0.3%, Benzoyl Peroxide 2.5%, Niacinamide 4%)
Name Of Product 21: Dermatitis Cream\\n(Fluocinolone Acetonide 0.01%, Niacinamide 4%)
Product 52 Unit Type: bottle(s)
Product 12 Amount/Unit (Numbers Only): 15
Name Of Product 53: Damon
Product 3 Amount/Unit (Numbers Only): 30
Name Of Product 31: Wart Corn and Callus Cream\\n(Cimetidine 10%, Lidocaine 5%, Salicylic Acid 40%)
Name Of Product 51: AHA + BHA Peel Pads
Name Of Product 24: Dermatitis Topical Solution\\n(Clobetasol Propionate 0.05%, Niacinamide 4%)
Product 51 Application Directions: Brighten the skin and exfoliate. For anti-aging or acne prone skin. Use 2-4 nights a week after cleansing and before serums/creams. Rinse off.
Product 31 Application Directions: Apply topically to the affected areas 1-2 times daily as directed.
Product 24 Application Directions: Patient is to mix with 14 ounces of Skin MD.\\nApply sparingly topically to the affected areas 5-10 times per week, taper as directed.
Detail Level: Zone
Product 51 Unit Type: unit(s)
Name Of Product 41: Alopecia Topical Solution for Men HP\\n(Minoxidil 7%, Finasteride 0.1%)
Product 54 Application Directions: Oil-free moisturizing gel. Good for plumping skin with wrinkles, crepiness and uneven texture. Use daily, morning or evening.
Name Of Product 12: Anti-Fungal Nail Solution\\n(Fluconazole 4%, Ibuprofen 2%, Itraconazole 1%, Terbinafine HCL 4%)
Name Of Product 3: Rosacea Silicone Gel\\n(Metronidazole 1%, Ivermectin 1%, Potassium Azeloyl Diglycinate 5%, Niacinamide 4%)
Name Of Product 23: Dermatitis Topical Solution\\n(Clobetasol Propionate 0.05%, Niacinamide 4%
Product 12 Application Directions: Apply topically to the affected nail(s) and surrounding areas twice daily as directed.
Product 23 Application Directions: Apply sparingly topically to the affected areas 3 times per week, taper as directed.
Product 3 Application Directions: Apply sparingly topically to the affected areas 1-2 times daily as directed.
Name Of Product 26: Psoriasis Cream\\n(Calcipotriene 0.005%, Niacinamide 4%)
Product 21 Application Directions: Apply sparingly topically to the affected areas 1-2 times a day M-F, taper as directed.
Product 1 Application Directions: Apply sparingly topically to acne affected zones daily as directed.
Product 53 Application Directions: Improves the appearance of dark spots, melasma, hyperpigmentation, sun damage. Use daily, morning or evening.
Name Of Product 11: Anti-Fungal Cream\\n(Ciclopirox 3%, Itraconazole 5%, Urea 20%, DMSO 5%)
Name Of Product 22: Dermatitis Cream\\n(Clobetasol Propionate 0.05%, Niacinamide 4%)
Name Of Product 2: Acne Body Wash\\n(Salicylic Acid 5%, Sodium Sulfacetamide Monohydrate 10%, Sulfur Precipitated 2.75%)
Product 26 Application Directions: Apply sparingly to all affected areas as directed 1-2 times per day.
Name Of Product 54: Hyaluronic Acid Gel
Product 2 Application Directions: Wash affected areas daily or as directed.

## 2024-05-31 ENCOUNTER — APPOINTMENT (RX ONLY)
Dept: URBAN - NONMETROPOLITAN AREA CLINIC 9 | Facility: CLINIC | Age: 19
Setting detail: DERMATOLOGY
End: 2024-05-31

## 2024-05-31 DIAGNOSIS — L70.0 ACNE VULGARIS: ICD-10-CM

## 2024-05-31 PROCEDURE — ? IN-HOUSE DISPENSING PHARMACY

## 2024-05-31 PROCEDURE — ? INVENTORY

## 2024-05-31 NOTE — PROCEDURE: IN-HOUSE DISPENSING PHARMACY
Product 67 Units Dispensed: 0
Product 51 Unit Type: unit(s)
Product 39 Unit Type: mg
Name Of Product 3: Rosacea Silicone Gel\\n(Metronidazole 1%, Ivermectin 1%, Potassium Azeloyl Diglycinate 5%, Niacinamide 4%)
Product 26 Amount/Unit (Numbers Only): 30
Product 22 Price/Unit (In Dollars): 50
Product 51 Application Directions: Brighten the skin and exfoliate. For anti-aging or acne prone skin. Use 2-4 nights a week after cleansing and before serums/creams. Rinse off.
Product 24 Application Directions: Patient is to mix with 14 ounces of Skin MD.\\nApply sparingly topically to the affected areas 5-10 times per week, taper as directed.
Product 12 Application Directions: Apply topically to the affected nail(s) and surrounding areas twice daily as directed.
Name Of Product 31: Wart Corn and Callus Cream\\n(Cimetidine 10%, Lidocaine 5%, Salicylic Acid 40%)
Product 24 Unit Type: grams
Product 54 Amount/Unit (Numbers Only): 1
Product 3 Application Directions: Apply sparingly topically to the affected areas 1-2 times daily as directed.
Name Of Product 51: AHA + BHA Peel Pads
Product 22 Amount/Unit (Numbers Only): 60
Name Of Product 25: Dermatitis Foam\\n(Clobetasol Propionate 0.05%, Calcipotriene 0.005%)
Product 31 Application Directions: Apply topically to the affected areas 1-2 times daily as directed.
Name Of Product 11: Anti-Fungal Cream\\n(Ciclopirox 3%, Itraconazole 5%, Urea 20%, DMSO 5%)
Product 52 Unit Type: bottle(s)
Name Of Product 2: Acne Body Wash\\n(Salicylic Acid 5%, Sodium Sulfacetamide Monohydrate 10%, Sulfur Precipitated 2.75%)
Product 52 Application Directions: Brightens the skin adding a healthy glow. Protects from pollution and environmental damage. Use daily, morning or evening.
Product 23 Application Directions: Apply sparingly topically to the affected areas 3 times per week, taper as directed.
Product 2 Application Directions: Wash affected areas daily or as directed.
Name Of Product 52: Antioxidant C Serum
Name Of Product 24: Dermatitis Topical Solution\\n(Clobetasol Propionate 0.05%, Niacinamide 4%)
Name Of Product 12: Anti-Fungal Nail Solution\\n(Fluconazole 4%, Ibuprofen 2%, Itraconazole 1%, Terbinafine HCL 4%)
Product 26 Application Directions: Apply sparingly to all affected areas as directed 1-2 times per day.
Name Of Product 22: Dermatitis Cream\\n(Clobetasol Propionate 0.05%, Niacinamide 4%)
Render Refills If Set To 0: Yes
Name Of Product 1: Acne Gel\\n(Adapalene 0.3%, Benzoyl Peroxide 2.5%, Niacinamide 4%)
Product 54 Application Directions: Oil-free moisturizing gel. Good for plumping skin with wrinkles, crepiness and uneven texture. Use daily, morning or evening.
Product 12 Amount/Unit (Numbers Only): 15
Product 22 Application Directions: Apply sparingly topically to the affected areas 1-2 times a day M-F, taper as directed.
Name Of Product 21: Dermatitis Cream\\n(Fluocinolone Acetonide 0.01%, Niacinamide 4%)
Product 1 Application Directions: Apply sparingly topically to acne affected zones daily as directed.
Name Of Product 23: Dermatitis Topical Solution\\n(Clobetasol Propionate 0.05%, Niacinamide 4%
Name Of Product 53: Damon
Name Of Product 41: Alopecia Topical Solution for Men HP\\n(Minoxidil 7%, Finasteride 0.1%)
Product 25 Application Directions: Apply sparingly topically to the affected areas 1-2 times daily, taper as directed.
Name Of Product 32: Actinic Keratosis Gel / Wart Gel\\n(Imiquimod 5%, Niacinamide 2%, Levocetirizine Dihydrochloride 1%)
Detail Level: Zone
Product 53 Application Directions: Improves the appearance of dark spots, melasma, hyperpigmentation, sun damage. Use daily, morning or evening.
Name Of Product 26: Psoriasis Cream\\n(Calcipotriene 0.005%, Niacinamide 4%)
Product 41 Application Directions: Apply topically to the affected areas 1 or 2 times daily or as directed.
Send Charges To Patient Encounter: No
Product 32 Application Directions: Apply sparingly topically to the affected areas daily as directed.
Product 2 Amount/Unit (Numbers Only): 120
Name Of Product 54: Hyaluronic Acid Gel

## 2024-07-03 ENCOUNTER — APPOINTMENT (RX ONLY)
Dept: URBAN - NONMETROPOLITAN AREA CLINIC 9 | Facility: CLINIC | Age: 19
Setting detail: DERMATOLOGY
End: 2024-07-03

## 2024-07-03 DIAGNOSIS — L70.0 ACNE VULGARIS: ICD-10-CM

## 2024-07-03 PROCEDURE — ? IN-HOUSE DISPENSING PHARMACY

## 2024-07-03 PROCEDURE — ? INVENTORY

## 2024-07-03 NOTE — PROCEDURE: IN-HOUSE DISPENSING PHARMACY
Product 69 Unit Type: mg
Product 12 Price/Unit (In Dollars): 50
Product 31 Units Dispensed: 0
Product 26 Application Directions: Apply sparingly to all affected areas as directed 1-2 times per day.
Name Of Product 22: Dermatitis Cream\\n(Clobetasol Propionate 0.05%, Niacinamide 4%)
Product 26 Unit Type: grams
Name Of Product 1: Acne Gel\\n(Adapalene 0.3%, Benzoyl Peroxide 2.5%, Niacinamide 4%)
Product 31 Application Directions: Apply topically to the affected areas 1-2 times daily as directed.
Product 24 Amount/Unit (Numbers Only): 60
Product 54 Application Directions: Oil-free moisturizing gel. Good for plumping skin with wrinkles, crepiness and uneven texture. Use daily, morning or evening.
Product 3 Amount/Unit (Numbers Only): 30
Product 12 Amount/Unit (Numbers Only): 15
Product 22 Application Directions: Apply sparingly topically to the affected areas 1-2 times a day M-F, taper as directed.
Product 54 Unit Type: bottle(s)
Product 1 Application Directions: Apply sparingly topically to acne affected zones daily as directed.
Product 52 Amount/Unit (Numbers Only): 1
Name Of Product 23: Dermatitis Topical Solution\\n(Clobetasol Propionate 0.05%, Niacinamide 4%
Name Of Product 41: Alopecia Topical Solution for Men HP\\n(Minoxidil 7%, Finasteride 0.1%)
Name Of Product 53: Damon
Product 25 Application Directions: Apply sparingly topically to the affected areas 1-2 times daily, taper as directed.
Name Of Product 21: Dermatitis Cream\\n(Fluocinolone Acetonide 0.01%, Niacinamide 4%)
Name Of Product 32: Actinic Keratosis Gel / Wart Gel\\n(Imiquimod 5%, Niacinamide 2%, Levocetirizine Dihydrochloride 1%)
Detail Level: Zone
Product 41 Application Directions: Apply topically to the affected areas 1 or 2 times daily or as directed.
Product 53 Application Directions: Improves the appearance of dark spots, melasma, hyperpigmentation, sun damage. Use daily, morning or evening.
Product 2 Amount/Unit (Numbers Only): 120
Name Of Product 26: Psoriasis Cream\\n(Calcipotriene 0.005%, Niacinamide 4%)
Send Charges To Patient Encounter: No
Product 32 Application Directions: Apply sparingly topically to the affected areas daily as directed.
Name Of Product 54: Hyaluronic Acid Gel
Render Refills If Set To 0: Yes
Name Of Product 31: Wart Corn and Callus Cream\\n(Cimetidine 10%, Lidocaine 5%, Salicylic Acid 40%)
Name Of Product 52: Antioxidant C Serum
Product 3 Application Directions: Apply sparingly topically to the affected areas 1-2 times daily as directed.
Product 24 Application Directions: Patient is to mix with 14 ounces of Skin MD.\\nApply sparingly topically to the affected areas 5-10 times per week, taper as directed.
Product 12 Application Directions: Apply topically to the affected nail(s) and surrounding areas twice daily as directed.
Product 52 Application Directions: Brightens the skin adding a healthy glow. Protects from pollution and environmental damage. Use daily, morning or evening.
Name Of Product 25: Dermatitis Foam\\n(Clobetasol Propionate 0.05%, Calcipotriene 0.005%)
Name Of Product 11: Anti-Fungal Cream\\n(Ciclopirox 3%, Itraconazole 5%, Urea 20%, DMSO 5%)
Name Of Product 2: Acne Body Wash\\n(Salicylic Acid 5%, Sodium Sulfacetamide Monohydrate 10%, Sulfur Precipitated 2.75%)
Name Of Product 51: AHA + BHA Peel Pads
Product 2 Application Directions: Wash affected areas daily or as directed.
Product 23 Application Directions: Apply sparingly topically to the affected areas 3 times per week, taper as directed.
Name Of Product 24: Dermatitis Topical Solution\\n(Clobetasol Propionate 0.05%, Niacinamide 4%)
Product 51 Application Directions: Brighten the skin and exfoliate. For anti-aging or acne prone skin. Use 2-4 nights a week after cleansing and before serums/creams. Rinse off.
Name Of Product 12: Anti-Fungal Nail Solution\\n(Fluconazole 4%, Ibuprofen 2%, Itraconazole 1%, Terbinafine HCL 4%)
Product 51 Unit Type: unit(s)
Name Of Product 3: Rosacea Silicone Gel\\n(Metronidazole 1%, Ivermectin 1%, Potassium Azeloyl Diglycinate 5%, Niacinamide 4%)

## 2024-07-23 ENCOUNTER — APPOINTMENT (RX ONLY)
Dept: URBAN - NONMETROPOLITAN AREA CLINIC 9 | Facility: CLINIC | Age: 19
Setting detail: DERMATOLOGY
End: 2024-07-23

## 2024-07-23 DIAGNOSIS — L70.0 ACNE VULGARIS: ICD-10-CM

## 2024-07-23 PROCEDURE — 99213 OFFICE O/P EST LOW 20 MIN: CPT

## 2024-07-23 PROCEDURE — ? INVENTORY

## 2024-07-23 PROCEDURE — ? PRESCRIPTION

## 2024-07-23 PROCEDURE — ? TREATMENT REGIMEN

## 2024-07-23 PROCEDURE — ? COUNSELING

## 2024-07-23 RX ORDER — MINOCYCLINE HYDROCHLORIDE 100 MG/1
CAPSULE ORAL
Qty: 60 | Refills: 5 | Status: ERX

## 2024-07-23 ASSESSMENT — LOCATION SIMPLE DESCRIPTION DERM
LOCATION SIMPLE: CHIN
LOCATION SIMPLE: RIGHT SHOULDER
LOCATION SIMPLE: LEFT SHOULDER
LOCATION SIMPLE: RIGHT CHEEK
LOCATION SIMPLE: LEFT CHEEK

## 2024-07-23 ASSESSMENT — LOCATION ZONE DERM
LOCATION ZONE: ARM
LOCATION ZONE: FACE

## 2024-07-23 ASSESSMENT — LOCATION DETAILED DESCRIPTION DERM
LOCATION DETAILED: LEFT CHIN
LOCATION DETAILED: LEFT ANTERIOR SHOULDER
LOCATION DETAILED: LEFT LATERAL MALAR CHEEK
LOCATION DETAILED: RIGHT CENTRAL MALAR CHEEK
LOCATION DETAILED: RIGHT ANTERIOR SHOULDER

## 2024-09-09 ENCOUNTER — APPOINTMENT (RX ONLY)
Dept: URBAN - NONMETROPOLITAN AREA CLINIC 9 | Facility: CLINIC | Age: 19
Setting detail: DERMATOLOGY
End: 2024-09-09

## 2024-09-09 DIAGNOSIS — L70.0 ACNE VULGARIS: ICD-10-CM

## 2024-09-09 PROCEDURE — ? INVENTORY

## 2024-09-09 PROCEDURE — ? IN-HOUSE DISPENSING PHARMACY

## 2024-09-09 NOTE — PROCEDURE: IN-HOUSE DISPENSING PHARMACY
Product 30 Price/Unit (In Dollars): 0
Product 24 Price/Unit (In Dollars): 50
Product 54 Amount/Unit (Numbers Only): 1
Name Of Product 22: Dermatitis Cream\\n(Clobetasol Propionate 0.05%, Niacinamide 4%)
Product 26 Application Directions: Apply sparingly to all affected areas as directed 1-2 times per day.
Product 52 Application Directions: Brightens the skin adding a healthy glow. Protects from pollution and environmental damage. Use daily, morning or evening.
Name Of Product 41: Alopecia Topical Solution for Men HP\\n(Minoxidil 7%, Finasteride 0.1%)
Product 32 Application Directions: Apply sparingly topically to the affected areas daily as directed.
Product 67 Unit Type: mg
Product 1 Unit Type: grams
Product 52 Unit Type: bottle(s)
Name Of Product 2: Acne Body Wash\\n(Salicylic Acid 5%, Sodium Sulfacetamide Monohydrate 10%, Sulfur Precipitated 2.75%)
Product 24 Amount/Unit (Numbers Only): 60
Name Of Product 53: Damon
Product 22 Application Directions: Apply sparingly topically to the affected areas 1-2 times a day M-F, taper as directed.
Name Of Product 11: Anti-Fungal Cream\\n(Ciclopirox 3%, Itraconazole 5%, Urea 20%, DMSO 5%)
Product 11 Amount/Unit (Numbers Only): 30
Product 41 Application Directions: Apply topically to the affected areas 1 or 2 times daily or as directed.
Product 2 Application Directions: Wash affected areas daily or as directed.
Render Product Pricing In Note: No
Name Of Product 21: Dermatitis Cream\\n(Fluocinolone Acetonide 0.01%, Niacinamide 4%)
Product 25 Application Directions: Apply sparingly topically to the affected areas 1-2 times daily, taper as directed.
Render Refills If Set To 0: Yes
Product 51 Application Directions: Brighten the skin and exfoliate. For anti-aging or acne prone skin. Use 2-4 nights a week after cleansing and before serums/creams. Rinse off.
Product 31 Application Directions: Apply topically to the affected areas 1-2 times daily as directed.
Product 12 Application Directions: Apply topically to the affected nail(s) and surrounding areas twice daily as directed.
Product 51 Unit Type: unit(s)
Name Of Product 1: Acne Gel\\n(Adapalene 0.3%, Benzoyl Peroxide 2.5%, Niacinamide 4%)
Name Of Product 52: Antioxidant C Serum
Name Of Product 26: Psoriasis Cream\\n(Calcipotriene 0.005%, Niacinamide 4%)
Name Of Product 32: Actinic Keratosis Gel / Wart Gel\\n(Imiquimod 5%, Niacinamide 2%, Levocetirizine Dihydrochloride 1%)
Product 1 Application Directions: Apply sparingly topically to acne affected zones daily as directed.
Product 24 Application Directions: Patient is to mix with 14 ounces of Skin MD.\\nApply sparingly topically to the affected areas 5-10 times per week, taper as directed.
Product 11 Application Directions: Apply sparingly topically to the affected areas 1-2 times daily as directed.
Detail Level: Zone
Name Of Product 51: AHA + BHA Peel Pads
Name Of Product 25: Dermatitis Foam\\n(Clobetasol Propionate 0.05%, Calcipotriene 0.005%)
Name Of Product 31: Wart Corn and Callus Cream\\n(Cimetidine 10%, Lidocaine 5%, Salicylic Acid 40%)
Name Of Product 12: Anti-Fungal Nail Solution\\n(Fluconazole 4%, Ibuprofen 2%, Itraconazole 1%, Terbinafine HCL 4%)
Product 2 Amount/Unit (Numbers Only): 120
Name Of Product 23: Dermatitis Topical Solution\\n(Clobetasol Propionate 0.05%, Niacinamide 4%
Product 53 Application Directions: Improves the appearance of dark spots, melasma, hyperpigmentation, sun damage. Use daily, morning or evening.
Name Of Product 3: Rosacea Silicone Gel\\n(Metronidazole 1%, Ivermectin 1%, Potassium Azeloyl Diglycinate 5%, Niacinamide 4%)
Product 23 Application Directions: Apply sparingly topically to the affected areas 3 times per week, taper as directed.
Name Of Product 54: Hyaluronic Acid Gel
Product 12 Amount/Unit (Numbers Only): 15
Product 54 Application Directions: Oil-free moisturizing gel. Good for plumping skin with wrinkles, crepiness and uneven texture. Use daily, morning or evening.
Name Of Product 24: Dermatitis Topical Solution\\n(Clobetasol Propionate 0.05%, Niacinamide 4%)

## 2024-10-23 ENCOUNTER — APPOINTMENT (RX ONLY)
Dept: URBAN - NONMETROPOLITAN AREA CLINIC 9 | Facility: CLINIC | Age: 19
Setting detail: DERMATOLOGY
End: 2024-10-23

## 2024-10-23 DIAGNOSIS — L70.0 ACNE VULGARIS: ICD-10-CM

## 2024-10-23 PROCEDURE — ? IN-HOUSE DISPENSING PHARMACY

## 2024-10-23 PROCEDURE — ? INVENTORY

## 2024-10-23 NOTE — PROCEDURE: IN-HOUSE DISPENSING PHARMACY
Product 80 Unit Type: mg
Product 58 Refills: 0
Product 3 Application Directions: Apply sparingly topically to the affected areas 1-2 times daily as directed.
Name Of Product 41: Alopecia Topical Solution for Men HP\\n(Minoxidil 7%, Finasteride 0.1%)
Product 22 Application Directions: Apply sparingly topically to the affected areas 1-2 times a day M-F, taper as directed.
Product 1 Price/Unit (In Dollars): 50
Product 31 Amount/Unit (Numbers Only): 30
Product 51 Application Directions: Brighten the skin and exfoliate. For anti-aging or acne prone skin. Use 2-4 nights a week after cleansing and before serums/creams. Rinse off.
Product 24 Amount/Unit (Numbers Only): 60
Product 11 Unit Type: grams
Product 53 Amount/Unit (Numbers Only): 1
Name Of Product 3: Rosacea Silicone Gel\\n(Metronidazole 1%, Ivermectin 1%, Potassium Azeloyl Diglycinate 5%, Niacinamide 4%)
Name Of Product 22: Dermatitis Cream\\n(Clobetasol Propionate 0.05%, Niacinamide 4%)
Name Of Product 51: AHA + BHA Peel Pads
Name Of Product 11: Anti-Fungal Cream\\n(Ciclopirox 3%, Itraconazole 5%, Urea 20%, DMSO 5%)
Product 2 Application Directions: Wash affected areas daily or as directed.
Name Of Product 26: Psoriasis Cream\\n(Calcipotriene 0.005%, Niacinamide 4%)
Product 52 Unit Type: bottle(s)
Product 23 Application Directions: Apply sparingly topically to the affected areas 3 times per week, taper as directed.
Product 52 Application Directions: Brightens the skin adding a healthy glow. Protects from pollution and environmental damage. Use daily, morning or evening.
Detail Level: Zone
Product 12 Application Directions: Apply topically to the affected nail(s) and surrounding areas twice daily as directed.
Product 41 Application Directions: Apply topically to the affected areas 1 or 2 times daily or as directed.
Product 32 Application Directions: Apply sparingly topically to the affected areas daily as directed.
Name Of Product 23: Dermatitis Topical Solution\\n(Clobetasol Propionate 0.05%, Niacinamide 4%
Name Of Product 52: Antioxidant C Serum
Name Of Product 12: Anti-Fungal Nail Solution\\n(Fluconazole 4%, Ibuprofen 2%, Itraconazole 1%, Terbinafine HCL 4%)
Product 51 Unit Type: unit(s)
Name Of Product 1: Acne Gel\\n(Adapalene 0.3%, Benzoyl Peroxide 2.5%, Niacinamide 4%)
Product 24 Application Directions: Patient is to mix with 14 ounces of Skin MD.\\nApply sparingly topically to the affected areas 5-10 times per week, taper as directed.
Product 53 Application Directions: Improves the appearance of dark spots, melasma, hyperpigmentation, sun damage. Use daily, morning or evening.
Product 26 Application Directions: Apply sparingly to all affected areas as directed 1-2 times per day.
Name Of Product 32: Actinic Keratosis Gel / Wart Gel\\n(Imiquimod 5%, Niacinamide 2%, Levocetirizine Dihydrochloride 1%)
Render Refills If Set To 0: Yes
Render Product Pricing In Note: No
Name Of Product 24: Dermatitis Topical Solution\\n(Clobetasol Propionate 0.05%, Niacinamide 4%)
Product 2 Amount/Unit (Numbers Only): 120
Name Of Product 53: Damon
Name Of Product 2: Acne Body Wash\\n(Salicylic Acid 5%, Sodium Sulfacetamide Monohydrate 10%, Sulfur Precipitated 2.75%)
Name Of Product 21: Dermatitis Cream\\n(Fluocinolone Acetonide 0.01%, Niacinamide 4%)
Product 25 Application Directions: Apply sparingly topically to the affected areas 1-2 times daily, taper as directed.
Product 12 Amount/Unit (Numbers Only): 15
Product 54 Application Directions: Oil-free moisturizing gel. Good for plumping skin with wrinkles, crepiness and uneven texture. Use daily, morning or evening.
Name Of Product 31: Wart Corn and Callus Cream\\n(Cimetidine 10%, Lidocaine 5%, Salicylic Acid 40%)
Product 1 Application Directions: Apply sparingly topically to acne affected zones daily as directed.
Name Of Product 25: Dermatitis Foam\\n(Clobetasol Propionate 0.05%, Calcipotriene 0.005%)
Name Of Product 54: Hyaluronic Acid Gel
Product 31 Application Directions: Apply topically to the affected areas 1-2 times daily as directed.

## 2024-11-15 ENCOUNTER — APPOINTMENT (RX ONLY)
Dept: URBAN - NONMETROPOLITAN AREA CLINIC 9 | Facility: CLINIC | Age: 19
Setting detail: DERMATOLOGY
End: 2024-11-15

## 2024-11-15 DIAGNOSIS — L70.0 ACNE VULGARIS: ICD-10-CM

## 2024-11-15 DIAGNOSIS — L72.8 OTHER FOLLICULAR CYSTS OF THE SKIN AND SUBCUTANEOUS TISSUE: ICD-10-CM

## 2024-11-15 PROCEDURE — ? TREATMENT REGIMEN

## 2024-11-15 PROCEDURE — ? INVENTORY

## 2024-11-15 PROCEDURE — ? COUNSELING

## 2024-11-15 PROCEDURE — 99213 OFFICE O/P EST LOW 20 MIN: CPT

## 2024-11-15 PROCEDURE — ? IN-HOUSE DISPENSING PHARMACY

## 2024-11-15 PROCEDURE — ? OBSERVATION

## 2024-11-15 ASSESSMENT — LOCATION DETAILED DESCRIPTION DERM
LOCATION DETAILED: RIGHT ANTERIOR SHOULDER
LOCATION DETAILED: LEFT ANTERIOR SHOULDER
LOCATION DETAILED: RIGHT CENTRAL MALAR CHEEK
LOCATION DETAILED: LEFT CHIN
LOCATION DETAILED: LEFT SUPERIOR CENTRAL MALAR CHEEK
LOCATION DETAILED: LEFT LATERAL MALAR CHEEK

## 2024-11-15 ASSESSMENT — LOCATION SIMPLE DESCRIPTION DERM
LOCATION SIMPLE: RIGHT CHEEK
LOCATION SIMPLE: CHIN
LOCATION SIMPLE: RIGHT SHOULDER
LOCATION SIMPLE: LEFT SHOULDER
LOCATION SIMPLE: LEFT CHEEK

## 2024-11-15 ASSESSMENT — LOCATION ZONE DERM
LOCATION ZONE: FACE
LOCATION ZONE: ARM

## 2024-11-15 NOTE — PROCEDURE: IN-HOUSE DISPENSING PHARMACY
Product 31 Price/Unit (In Dollars): 50
Product 24 Refills: 0
Product 49 Unit Type: mg
Product 26 Amount/Unit (Numbers Only): 30
Product 1 Application Directions: Apply sparingly topically to acne affected zones daily as directed.
Product 54 Application Directions: Oil-free moisturizing gel. Good for plumping skin with wrinkles, crepiness and uneven texture. Use daily, morning or evening.
Product 24 Amount/Unit (Numbers Only): 60
Name Of Product 41: Alopecia Topical Solution for Men HP\\n(Minoxidil 7%, Finasteride 0.1%)
Product 1 Unit Type: grams
Product 22 Application Directions: Apply sparingly topically to the affected areas 1-2 times a day M-F, taper as directed.
Product 54 Unit Type: bottle(s)
Product 52 Amount/Unit (Numbers Only): 1
Name Of Product 2: Acne Body Wash\\n(Salicylic Acid 5%, Sodium Sulfacetamide Monohydrate 10%, Sulfur Precipitated 2.75%)
Product 41 Application Directions: Apply topically to the affected areas 1 or 2 times daily or as directed.
Name Of Product 23: Dermatitis Topical Solution\\n(Clobetasol Propionate 0.05%, Niacinamide 4%
Name Of Product 21: Dermatitis Cream\\n(Fluocinolone Acetonide 0.01%, Niacinamide 4%)
Name Of Product 12: Anti-Fungal Nail Solution\\n(Fluconazole 4%, Ibuprofen 2%, Itraconazole 1%, Terbinafine HCL 4%)
Product 2 Amount/Unit (Numbers Only): 120
Product 32 Application Directions: Apply sparingly topically to the affected areas daily as directed.
Render Product Pricing In Note: No
Product 53 Application Directions: Improves the appearance of dark spots, melasma, hyperpigmentation, sun damage. Use daily, morning or evening.
Render Refills If Set To 0: Yes
Product 12 Application Directions: Apply topically to the affected nail(s) and surrounding areas twice daily as directed.
Name Of Product 1: Acne Gel\\n(Adapalene 0.3%, Benzoyl Peroxide 2.5%, Niacinamide 4%)
Name Of Product 54: Hyaluronic Acid Gel
Name Of Product 22: Dermatitis Cream\\n(Clobetasol Propionate 0.05%, Niacinamide 4%)
Product 24 Application Directions: Patient is to mix with 14 ounces of Skin MD.\\nApply sparingly topically to the affected areas 5-10 times per week, taper as directed.
Name Of Product 11: Anti-Fungal Cream\\n(Ciclopirox 3%, Itraconazole 5%, Urea 20%, DMSO 5%)
Product 31 Application Directions: Apply topically to the affected areas 1-2 times daily as directed.
Product 52 Application Directions: Brightens the skin adding a healthy glow. Protects from pollution and environmental damage. Use daily, morning or evening.
Product 11 Application Directions: Apply sparingly topically to the affected areas 1-2 times daily as directed.
Detail Level: Zone
Name Of Product 32: Actinic Keratosis Gel / Wart Gel\\n(Imiquimod 5%, Niacinamide 2%, Levocetirizine Dihydrochloride 1%)
Name Of Product 53: Damon
Product 2 Application Directions: Wash affected areas daily or as directed.
Name Of Product 25: Dermatitis Foam\\n(Clobetasol Propionate 0.05%, Calcipotriene 0.005%)
Name Of Product 51: AHA + BHA Peel Pads
Product 23 Application Directions: Apply sparingly topically to the affected areas 3 times per week, taper as directed.
Name Of Product 3: Rosacea Silicone Gel\\n(Metronidazole 1%, Ivermectin 1%, Potassium Azeloyl Diglycinate 5%, Niacinamide 4%)
Product 25 Application Directions: Apply sparingly topically to the affected areas 1-2 times daily, taper as directed.
Product 51 Application Directions: Brighten the skin and exfoliate. For anti-aging or acne prone skin. Use 2-4 nights a week after cleansing and before serums/creams. Rinse off.
Product 12 Amount/Unit (Numbers Only): 15
Name Of Product 24: Dermatitis Topical Solution\\n(Clobetasol Propionate 0.05%, Niacinamide 4%)
Product 51 Unit Type: unit(s)
Name Of Product 31: Wart Corn and Callus Cream\\n(Cimetidine 10%, Lidocaine 5%, Salicylic Acid 40%)
Product 26 Application Directions: Apply sparingly to all affected areas as directed 1-2 times per day.
Name Of Product 52: Antioxidant C Serum
Name Of Product 26: Psoriasis Cream\\n(Calcipotriene 0.005%, Niacinamide 4%)

## 2024-11-15 NOTE — HPI: SKIN LESION
What Type Of Note Output Would You Prefer (Optional)?: Standard Output
Is This A New Presentation, Or A Follow-Up?: Skin Lesion
Additional History: States it initially seemed like a deeper lump, then the skin above the lump got a little darker. It is improving.

## 2024-12-17 ENCOUNTER — APPOINTMENT (OUTPATIENT)
Dept: URBAN - NONMETROPOLITAN AREA CLINIC 9 | Facility: CLINIC | Age: 19
Setting detail: DERMATOLOGY
End: 2024-12-17

## 2024-12-17 DIAGNOSIS — L70.0 ACNE VULGARIS: ICD-10-CM

## 2024-12-17 PROCEDURE — ? INVENTORY

## 2024-12-17 PROCEDURE — ? IN-HOUSE DISPENSING PHARMACY

## 2024-12-17 NOTE — PROCEDURE: IN-HOUSE DISPENSING PHARMACY
Product 20 Units Dispensed: 0
Product 51 Unit Type: unit(s)
Product 72 Unit Type: mg
Render Product Pricing In Note: No
Product 22 Amount/Unit (Numbers Only): 60
Name Of Product 25: Dermatitis Foam\\n(Clobetasol Propionate 0.05%, Calcipotriene 0.005%)
Name Of Product 52: Antioxidant C Serum
Product 32 Application Directions: Apply sparingly topically to the affected areas daily as directed.
Render Refills If Set To 0: Yes
Product 12 Price/Unit (In Dollars): 50
Product 41 Application Directions: Apply topically to the affected areas 1 or 2 times daily or as directed.
Name Of Product 1: Acne Gel\\n(Adapalene 0.3%, Benzoyl Peroxide 2.5%, Niacinamide 4%)
Product 54 Amount/Unit (Numbers Only): 1
Product 32 Unit Type: grams
Product 25 Application Directions: Apply sparingly topically to the affected areas 1-2 times daily, taper as directed.
Name Of Product 21: Dermatitis Cream\\n(Fluocinolone Acetonide 0.01%, Niacinamide 4%)
Product 52 Application Directions: Brightens the skin adding a healthy glow. Protects from pollution and environmental damage. Use daily, morning or evening.
Product 12 Amount/Unit (Numbers Only): 15
Product 3 Amount/Unit (Numbers Only): 30
Product 52 Unit Type: bottle(s)
Product 1 Application Directions: Apply sparingly topically to acne affected zones daily as directed.
Name Of Product 31: Wart Corn and Callus Cream\\n(Cimetidine 10%, Lidocaine 5%, Salicylic Acid 40%)
Name Of Product 24: Dermatitis Topical Solution\\n(Clobetasol Propionate 0.05%, Niacinamide 4%)
Name Of Product 51: AHA + BHA Peel Pads
Product 31 Application Directions: Apply topically to the affected areas 1-2 times daily as directed.
Detail Level: Zone
Product 24 Application Directions: Patient is to mix with 14 ounces of Skin MD.\\nApply sparingly topically to the affected areas 5-10 times per week, taper as directed.
Product 51 Application Directions: Brighten the skin and exfoliate. For anti-aging or acne prone skin. Use 2-4 nights a week after cleansing and before serums/creams. Rinse off.
Name Of Product 32: Actinic Keratosis Gel / Wart Gel\\n(Imiquimod 5%, Niacinamide 2%, Levocetirizine Dihydrochloride 1%)
Name Of Product 41: Alopecia Topical Solution for Men HP\\n(Minoxidil 7%, Finasteride 0.1%)
Product 2 Amount/Unit (Numbers Only): 120
Name Of Product 54: Hyaluronic Acid Gel
Name Of Product 12: Anti-Fungal Nail Solution\\n(Fluconazole 4%, Ibuprofen 2%, Itraconazole 1%, Terbinafine HCL 4%)
Name Of Product 3: Rosacea Silicone Gel\\n(Metronidazole 1%, Ivermectin 1%, Potassium Azeloyl Diglycinate 5%, Niacinamide 4%)
Name Of Product 23: Dermatitis Topical Solution\\n(Clobetasol Propionate 0.05%, Niacinamide 4%
Product 54 Application Directions: Oil-free moisturizing gel. Good for plumping skin with wrinkles, crepiness and uneven texture. Use daily, morning or evening.
Product 12 Application Directions: Apply topically to the affected nail(s) and surrounding areas twice daily as directed.
Product 3 Application Directions: Apply sparingly topically to the affected areas 1-2 times daily as directed.
Product 23 Application Directions: Apply sparingly topically to the affected areas 3 times per week, taper as directed.
Name Of Product 26: Psoriasis Cream\\n(Calcipotriene 0.005%, Niacinamide 4%)
Name Of Product 53: Damon
Product 21 Application Directions: Apply sparingly topically to the affected areas 1-2 times a day M-F, taper as directed.
Name Of Product 11: Anti-Fungal Cream\\n(Ciclopirox 3%, Itraconazole 5%, Urea 20%, DMSO 5%)
Name Of Product 2: Acne Body Wash\\n(Salicylic Acid 5%, Sodium Sulfacetamide Monohydrate 10%, Sulfur Precipitated 2.75%)
Product 26 Application Directions: Apply sparingly to all affected areas as directed 1-2 times per day.
Name Of Product 22: Dermatitis Cream\\n(Clobetasol Propionate 0.05%, Niacinamide 4%)
Product 53 Application Directions: Improves the appearance of dark spots, melasma, hyperpigmentation, sun damage. Use daily, morning or evening.
Product 2 Application Directions: Wash affected areas daily or as directed.

## 2025-01-17 ENCOUNTER — APPOINTMENT (OUTPATIENT)
Dept: URBAN - NONMETROPOLITAN AREA CLINIC 9 | Facility: CLINIC | Age: 20
Setting detail: DERMATOLOGY
End: 2025-01-17

## 2025-01-17 DIAGNOSIS — L70.0 ACNE VULGARIS: ICD-10-CM

## 2025-01-17 PROCEDURE — ? IN-HOUSE DISPENSING PHARMACY

## 2025-01-17 PROCEDURE — ? INVENTORY

## 2025-01-17 NOTE — PROCEDURE: IN-HOUSE DISPENSING PHARMACY
Product 54 Refills: 0
Product 71 Unit Type: mg
Product 24 Unit Type: grams
Product 31 Amount/Unit (Numbers Only): 30
Product 22 Amount/Unit (Numbers Only): 60
Product 2 Application Directions: Wash affected areas daily or as directed.
Name Of Product 25: Dermatitis Foam\\n(Clobetasol Propionate 0.05%, Calcipotriene 0.005%)
Product 54 Amount/Unit (Numbers Only): 1
Product 52 Application Directions: Brightens the skin adding a healthy glow. Protects from pollution and environmental damage. Use daily, morning or evening.
Product 11 Price/Unit (In Dollars): 50
Name Of Product 41: Alopecia Topical Solution for Men HP\\n(Minoxidil 7%, Finasteride 0.1%)
Product 52 Unit Type: bottle(s)
Product 25 Application Directions: Apply sparingly topically to the affected areas 1-2 times daily, taper as directed.
Name Of Product 21: Dermatitis Cream\\n(Fluocinolone Acetonide 0.01%, Niacinamide 4%)
Name Of Product 53: Damon
Product 2 Amount/Unit (Numbers Only): 120
Product 41 Application Directions: Apply topically to the affected areas 1 or 2 times daily or as directed.
Name Of Product 51: AHA + BHA Peel Pads
Name Of Product 3: Rosacea Silicone Gel\\n(Metronidazole 1%, Ivermectin 1%, Potassium Azeloyl Diglycinate 5%, Niacinamide 4%)
Name Of Product 12: Anti-Fungal Nail Solution\\n(Fluconazole 4%, Ibuprofen 2%, Itraconazole 1%, Terbinafine HCL 4%)
Product 1 Application Directions: Apply sparingly topically to acne affected zones daily as directed.
Name Of Product 24: Dermatitis Topical Solution\\n(Clobetasol Propionate 0.05%, Niacinamide 4%)
Product 51 Application Directions: Brighten the skin and exfoliate. For anti-aging or acne prone skin. Use 2-4 nights a week after cleansing and before serums/creams. Rinse off.
Product 12 Application Directions: Apply topically to the affected nail(s) and surrounding areas twice daily as directed.
Product 3 Application Directions: Apply sparingly topically to the affected areas 1-2 times daily as directed.
Product 51 Unit Type: unit(s)
Product 24 Application Directions: Patient is to mix with 14 ounces of Skin MD.\\nApply sparingly topically to the affected areas 5-10 times per week, taper as directed.
Name Of Product 2: Acne Body Wash\\n(Salicylic Acid 5%, Sodium Sulfacetamide Monohydrate 10%, Sulfur Precipitated 2.75%)
Name Of Product 52: Antioxidant C Serum
Product 22 Application Directions: Apply sparingly topically to the affected areas 1-2 times a day M-F, taper as directed.
Product 54 Application Directions: Oil-free moisturizing gel. Good for plumping skin with wrinkles, crepiness and uneven texture. Use daily, morning or evening.
Send Charges To Patient Encounter: No
Name Of Product 11: Anti-Fungal Cream\\n(Ciclopirox 3%, Itraconazole 5%, Urea 20%, DMSO 5%)
Name Of Product 23: Dermatitis Topical Solution\\n(Clobetasol Propionate 0.05%, Niacinamide 4%
Name Of Product 32: Actinic Keratosis Gel / Wart Gel\\n(Imiquimod 5%, Niacinamide 2%, Levocetirizine Dihydrochloride 1%)
Render Refills If Set To 0: Yes
Product 32 Application Directions: Apply sparingly topically to the affected areas daily as directed.
Product 23 Application Directions: Apply sparingly topically to the affected areas 3 times per week, taper as directed.
Name Of Product 1: Acne Gel\\n(Adapalene 0.3%, Benzoyl Peroxide 2.5%, Niacinamide 4%)
Name Of Product 26: Psoriasis Cream\\n(Calcipotriene 0.005%, Niacinamide 4%)
Product 53 Application Directions: Improves the appearance of dark spots, melasma, hyperpigmentation, sun damage. Use daily, morning or evening.
Name Of Product 22: Dermatitis Cream\\n(Clobetasol Propionate 0.05%, Niacinamide 4%)
Name Of Product 31: Wart Corn and Callus Cream\\n(Cimetidine 10%, Lidocaine 5%, Salicylic Acid 40%)
Product 26 Application Directions: Apply sparingly to all affected areas as directed 1-2 times per day.
Product 12 Amount/Unit (Numbers Only): 15
Name Of Product 54: Hyaluronic Acid Gel
Product 31 Application Directions: Apply topically to the affected areas 1-2 times daily as directed.
Detail Level: Zone

## 2025-01-21 ENCOUNTER — APPOINTMENT (OUTPATIENT)
Dept: URBAN - NONMETROPOLITAN AREA CLINIC 9 | Facility: CLINIC | Age: 20
Setting detail: DERMATOLOGY
End: 2025-01-21

## 2025-01-21 DIAGNOSIS — L72.8 OTHER FOLLICULAR CYSTS OF THE SKIN AND SUBCUTANEOUS TISSUE: ICD-10-CM

## 2025-01-21 DIAGNOSIS — L70.0 ACNE VULGARIS: ICD-10-CM

## 2025-01-21 PROCEDURE — 99213 OFFICE O/P EST LOW 20 MIN: CPT

## 2025-01-21 PROCEDURE — ? OBSERVATION

## 2025-01-21 PROCEDURE — ? PRESCRIPTION

## 2025-01-21 PROCEDURE — ? COUNSELING

## 2025-01-21 PROCEDURE — ? TREATMENT REGIMEN

## 2025-01-21 RX ORDER — MINOCYCLINE HYDROCHLORIDE 100 MG/1
CAPSULE ORAL
Qty: 60 | Refills: 5 | Status: ERX

## 2025-01-21 ASSESSMENT — LOCATION SIMPLE DESCRIPTION DERM
LOCATION SIMPLE: LEFT CHEEK
LOCATION SIMPLE: RIGHT CHEEK
LOCATION SIMPLE: LEFT SHOULDER
LOCATION SIMPLE: RIGHT SHOULDER
LOCATION SIMPLE: CHIN

## 2025-01-21 ASSESSMENT — LOCATION ZONE DERM
LOCATION ZONE: FACE
LOCATION ZONE: ARM

## 2025-01-21 ASSESSMENT — LOCATION DETAILED DESCRIPTION DERM
LOCATION DETAILED: LEFT CHIN
LOCATION DETAILED: LEFT SUPERIOR CENTRAL MALAR CHEEK
LOCATION DETAILED: RIGHT ANTERIOR SHOULDER
LOCATION DETAILED: LEFT LATERAL MALAR CHEEK
LOCATION DETAILED: LEFT ANTERIOR SHOULDER
LOCATION DETAILED: RIGHT CENTRAL MALAR CHEEK

## 2025-01-21 NOTE — PROCEDURE: COUNSELING
Detail Level: Detailed
Submitted PA for Qulipta 30MG tablets  [facility]ia [facility] Key: [id number] STATUS: APPROVED 12/20/22 to 06/19/23; Approval letter attached. If this requires a response please respond to the pool ( [id number] [facility]). Thank you please advise patient.
Bactrim Pregnancy And Lactation Text: This medication is Pregnancy Category D and is known to cause fetal risk.  It is also excreted in breast milk.
Erythromycin Counseling:  I discussed with the patient the risks of erythromycin including but not limited to GI upset, allergic reaction, drug rash, diarrhea, increase in liver enzymes, and yeast infections.
Aklief counseling:  Patient advised to apply a pea-sized amount only at bedtime and wait 30 minutes after washing their face before applying.  If too drying, patient may add a non-comedogenic moisturizer.  The most commonly reported side effects including irritation, redness, scaling, dryness, stinging, burning, itching, and increased risk of sunburn.  The patient verbalized understanding of the proper use and possible adverse effects of retinoids.  All of the patient's questions and concerns were addressed.
Winlevi Counseling:  I discussed with the patient the risks of topical clascoterone including but not limited to erythema, scaling, itching, and stinging. Patient voiced their understanding.
Topical Retinoid Pregnancy And Lactation Text: This medication is Pregnancy Category C. It is unknown if this medication is excreted in breast milk.
Tazorac Pregnancy And Lactation Text: This medication is not safe during pregnancy. It is unknown if this medication is excreted in breast milk.
Include Pregnancy/Lactation Warning?: No
Birth Control Pills Pregnancy And Lactation Text: This medication should be avoided if pregnant and for the first 30 days post-partum.
Isotretinoin Counseling: Patient should get monthly blood tests, not donate blood, not drive at night if vision affected, not share medication, and not undergo elective surgery for 6 months after tx completed. Side effects reviewed, pt to contact office should one occur.
Sarecycline Pregnancy And Lactation Text: This medication is Pregnancy Category D and not consider safe during pregnancy. It is also excreted in breast milk.
Azelaic Acid Counseling: Patient counseled that medicine may cause skin irritation and to avoid applying near the eyes.  In the event of skin irritation, the patient was advised to reduce the amount of the drug applied or use it less frequently.   The patient verbalized understanding of the proper use and possible adverse effects of azelaic acid.  All of the patient's questions and concerns were addressed.
Isotretinoin Pregnancy And Lactation Text: This medication is Pregnancy Category X and is considered extremely dangerous during pregnancy. It is unknown if it is excreted in breast milk.
Spironolactone Counseling: Patient advised regarding risks of diarrhea, abdominal pain, hyperkalemia, birth defects (for female patients), liver toxicity and renal toxicity. The patient may need blood work to monitor liver and kidney function and potassium levels while on therapy. The patient verbalized understanding of the proper use and possible adverse effects of spironolactone.  All of the patient's questions and concerns were addressed.
Azelaic Acid Pregnancy And Lactation Text: This medication is considered safe during pregnancy and breast feeding.
Topical Clindamycin Counseling: Patient counseled that this medication may cause skin irritation or allergic reactions.  In the event of skin irritation, the patient was advised to reduce the amount of the drug applied or use it less frequently.   The patient verbalized understanding of the proper use and possible adverse effects of clindamycin.  All of the patient's questions and concerns were addressed.
Dapsone Counseling: I discussed with the patient the risks of dapsone including but not limited to hemolytic anemia, agranulocytosis, rashes, methemoglobinemia, kidney failure, peripheral neuropathy, headaches, GI upset, and liver toxicity.  Patients who start dapsone require monitoring including baseline LFTs and weekly CBCs for the first month, then every month thereafter.  The patient verbalized understanding of the proper use and possible adverse effects of dapsone.  All of the patient's questions and concerns were addressed.
Topical Sulfur Applications Counseling: Topical Sulfur Counseling: Patient counseled that this medication may cause skin irritation or allergic reactions.  In the event of skin irritation, the patient was advised to reduce the amount of the drug applied or use it less frequently.   The patient verbalized understanding of the proper use and possible adverse effects of topical sulfur application.  All of the patient's questions and concerns were addressed.
Benzoyl Peroxide Pregnancy And Lactation Text: This medication is Pregnancy Category C. It is unknown if benzoyl peroxide is excreted in breast milk.
Azithromycin Pregnancy And Lactation Text: This medication is considered safe during pregnancy and is also secreted in breast milk.
Doxycycline Counseling:  Patient counseled regarding possible photosensitivity and increased risk for sunburn.  Patient instructed to avoid sunlight, if possible.  When exposed to sunlight, patients should wear protective clothing, sunglasses, and sunscreen.  The patient was instructed to call the office immediately if the following severe adverse effects occur:  hearing changes, easy bruising/bleeding, severe headache, or vision changes.  The patient verbalized understanding of the proper use and possible adverse effects of doxycycline.  All of the patient's questions and concerns were addressed.
High Dose Vitamin A Pregnancy And Lactation Text: High dose vitamin A therapy is contraindicated during pregnancy and breast feeding.
Tetracycline Counseling: Patient counseled regarding possible photosensitivity and increased risk for sunburn.  Patient instructed to avoid sunlight, if possible.  When exposed to sunlight, patients should wear protective clothing, sunglasses, and sunscreen.  The patient was instructed to call the office immediately if the following severe adverse effects occur:  hearing changes, easy bruising/bleeding, severe headache, or vision changes.  The patient verbalized understanding of the proper use and possible adverse effects of tetracycline.  All of the patient's questions and concerns were addressed. Patient understands to avoid pregnancy while on therapy due to potential birth defects.
Erythromycin Pregnancy And Lactation Text: This medication is Pregnancy Category B and is considered safe during pregnancy. It is also excreted in breast milk.
Sarecycline Counseling: Patient advised regarding possible photosensitivity and discoloration of the teeth, skin, lips, tongue and gums.  Patient instructed to avoid sunlight, if possible.  When exposed to sunlight, patients should wear protective clothing, sunglasses, and sunscreen.  The patient was instructed to call the office immediately if the following severe adverse effects occur:  hearing changes, easy bruising/bleeding, severe headache, or vision changes.  The patient verbalized understanding of the proper use and possible adverse effects of sarecycline.  All of the patient's questions and concerns were addressed.
Aklief Pregnancy And Lactation Text: It is unknown if this medication is safe to use during pregnancy.  It is unknown if this medication is excreted in breast milk.  Breastfeeding women should use the topical cream on the smallest area of the skin for the shortest time needed while breastfeeding.  Do not apply to nipple and areola.
Tazorac Counseling:  Patient advised that medication is irritating and drying.  Patient may need to apply sparingly and wash off after an hour before eventually leaving it on overnight.  The patient verbalized understanding of the proper use and possible adverse effects of tazorac.  All of the patient's questions and concerns were addressed.
Doxycycline Pregnancy And Lactation Text: This medication is Pregnancy Category D and not consider safe during pregnancy. It is also excreted in breast milk but is considered safe for shorter treatment courses.
Bactrim Counseling:  I discussed with the patient the risks of sulfa antibiotics including but not limited to GI upset, allergic reaction, drug rash, diarrhea, dizziness, photosensitivity, and yeast infections.  Rarely, more serious reactions can occur including but not limited to aplastic anemia, agranulocytosis, methemoglobinemia, blood dyscrasias, liver or kidney failure, lung infiltrates or desquamative/blistering drug rashes.
Minocycline Counseling: Patient advised regarding possible photosensitivity and discoloration of the teeth, skin, lips, tongue and gums.  Patient instructed to avoid sunlight, if possible.  When exposed to sunlight, patients should wear protective clothing, sunglasses, and sunscreen.  The patient was instructed to call the office immediately if the following severe adverse effects occur:  hearing changes, easy bruising/bleeding, severe headache, or vision changes.  The patient verbalized understanding of the proper use and possible adverse effects of minocycline.  All of the patient's questions and concerns were addressed.
Topical Retinoid counseling:  Patient advised to apply a pea-sized amount only at bedtime and wait 30 minutes after washing their face before applying.  If too drying, patient may add a non-comedogenic moisturizer. The patient verbalized understanding of the proper use and possible adverse effects of retinoids.  All of the patient's questions and concerns were addressed.
Birth Control Pills Counseling: Birth Control Pill Counseling: I discussed with the patient the potential side effects of OCPs including but not limited to increased risk of stroke, heart attack, thrombophlebitis, deep venous thrombosis, hepatic adenomas, breast changes, GI upset, headaches, and depression.  The patient verbalized understanding of the proper use and possible adverse effects of OCPs. All of the patient's questions and concerns were addressed.
Winlevi Pregnancy And Lactation Text: This medication is considered safe during pregnancy and breastfeeding.
Azithromycin Counseling:  I discussed with the patient the risks of azithromycin including but not limited to GI upset, allergic reaction, drug rash, diarrhea, and yeast infections.
Dapsone Pregnancy And Lactation Text: This medication is Pregnancy Category C and is not considered safe during pregnancy or breast feeding.
High Dose Vitamin A Counseling: Side effects reviewed, pt to contact office should one occur.
Spironolactone Pregnancy And Lactation Text: This medication can cause feminization of the male fetus and should be avoided during pregnancy. The active metabolite is also found in breast milk.
Topical Sulfur Applications Pregnancy And Lactation Text: This medication is Pregnancy Category C and has an unknown safety profile during pregnancy. It is unknown if this topical medication is excreted in breast milk.
Topical Clindamycin Pregnancy And Lactation Text: This medication is Pregnancy Category B and is considered safe during pregnancy. It is unknown if it is excreted in breast milk.
Benzoyl Peroxide Counseling: Patient counseled that medicine may cause skin irritation and bleach clothing.  In the event of skin irritation, the patient was advised to reduce the amount of the drug applied or use it less frequently.   The patient verbalized understanding of the proper use and possible adverse effects of benzoyl peroxide.  All of the patient's questions and concerns were addressed.

## 2025-02-18 ENCOUNTER — APPOINTMENT (OUTPATIENT)
Dept: URBAN - NONMETROPOLITAN AREA CLINIC 9 | Facility: CLINIC | Age: 20
Setting detail: DERMATOLOGY
End: 2025-02-18

## 2025-02-18 DIAGNOSIS — L70.0 ACNE VULGARIS: ICD-10-CM

## 2025-02-18 PROCEDURE — ? IN-HOUSE DISPENSING PHARMACY

## 2025-02-18 PROCEDURE — ? INVENTORY

## 2025-02-18 NOTE — PROCEDURE: IN-HOUSE DISPENSING PHARMACY
Product 34 Units Dispensed: 0
Product 4 Unit Type: mg
Name Of Product 23: Dermatitis Topical Solution\\n(Clobetasol Propionate 0.05%, Niacinamide 4%
Product 41 Price/Unit (In Dollars): 50
Product 2 Amount/Unit (Numbers Only): 120
Name Of Product 12: Anti-Fungal Nail Solution\\n(Fluconazole 4%, Ibuprofen 2%, Itraconazole 1%, Terbinafine HCL 4%)
Name Of Product 51: AHA + BHA Peel Pads
Product 25 Amount/Unit (Numbers Only): 60
Product 23 Application Directions: Apply sparingly topically to the affected areas 3 times per week, taper as directed.
Product 32 Amount/Unit (Numbers Only): 30
Product 12 Application Directions: Apply topically to the affected nail(s) and surrounding areas twice daily as directed.
Product 53 Amount/Unit (Numbers Only): 1
Product 23 Unit Type: grams
Product 51 Application Directions: Brighten the skin and exfoliate. For anti-aging or acne prone skin. Use 2-4 nights a week after cleansing and before serums/creams. Rinse off.
Name Of Product 24: Dermatitis Topical Solution\\n(Clobetasol Propionate 0.05%, Niacinamide 4%)
Product 51 Unit Type: unit(s)
Product 1 Application Directions: Apply sparingly topically to acne affected zones daily as directed.
Product 26 Application Directions: Apply sparingly to all affected areas as directed 1-2 times per day.
Name Of Product 22: Dermatitis Cream\\n(Clobetasol Propionate 0.05%, Niacinamide 4%)
Product 54 Application Directions: Oil-free moisturizing gel. Good for plumping skin with wrinkles, crepiness and uneven texture. Use daily, morning or evening.
Name Of Product 2: Acne Body Wash\\n(Salicylic Acid 5%, Sodium Sulfacetamide Monohydrate 10%, Sulfur Precipitated 2.75%)
Product 22 Application Directions: Apply sparingly topically to the affected areas 1-2 times a day M-F, taper as directed.
Product 54 Unit Type: bottle(s)
Product 11 Application Directions: Apply sparingly topically to the affected areas 1-2 times daily as directed.
Name Of Product 32: Actinic Keratosis Gel / Wart Gel\\n(Imiquimod 5%, Niacinamide 2%, Levocetirizine Dihydrochloride 1%)
Product 2 Application Directions: Wash affected areas daily or as directed.
Name Of Product 53: Damon
Render Product Pricing In Note: No
Name Of Product 21: Dermatitis Cream\\n(Fluocinolone Acetonide 0.01%, Niacinamide 4%)
Product 25 Application Directions: Apply sparingly topically to the affected areas 1-2 times daily, taper as directed.
Product 41 Application Directions: Apply topically to the affected areas 1 or 2 times daily or as directed.
Render Refills If Set To 0: Yes
Name Of Product 3: Rosacea Silicone Gel\\n(Metronidazole 1%, Ivermectin 1%, Potassium Azeloyl Diglycinate 5%, Niacinamide 4%)
Product 32 Application Directions: Apply sparingly topically to the affected areas daily as directed.
Product 53 Application Directions: Improves the appearance of dark spots, melasma, hyperpigmentation, sun damage. Use daily, morning or evening.
Name Of Product 1: Acne Gel\\n(Adapalene 0.3%, Benzoyl Peroxide 2.5%, Niacinamide 4%)
Name Of Product 26: Psoriasis Cream\\n(Calcipotriene 0.005%, Niacinamide 4%)
Product 12 Amount/Unit (Numbers Only): 15
Name Of Product 54: Hyaluronic Acid Gel
Name Of Product 31: Wart Corn and Callus Cream\\n(Cimetidine 10%, Lidocaine 5%, Salicylic Acid 40%)
Name Of Product 52: Antioxidant C Serum
Product 24 Application Directions: Patient is to mix with 14 ounces of Skin MD.\\nApply sparingly topically to the affected areas 5-10 times per week, taper as directed.
Product 52 Application Directions: Brightens the skin adding a healthy glow. Protects from pollution and environmental damage. Use daily, morning or evening.
Product 31 Application Directions: Apply topically to the affected areas 1-2 times daily as directed.
Detail Level: Zone
Name Of Product 25: Dermatitis Foam\\n(Clobetasol Propionate 0.05%, Calcipotriene 0.005%)
Name Of Product 11: Anti-Fungal Cream\\n(Ciclopirox 3%, Itraconazole 5%, Urea 20%, DMSO 5%)
Name Of Product 41: Alopecia Topical Solution for Men HP\\n(Minoxidil 7%, Finasteride 0.1%)

## 2025-03-07 ENCOUNTER — APPOINTMENT (OUTPATIENT)
Dept: URBAN - NONMETROPOLITAN AREA CLINIC 9 | Facility: CLINIC | Age: 20
Setting detail: DERMATOLOGY
End: 2025-03-07

## 2025-03-07 DIAGNOSIS — L70.0 ACNE VULGARIS: ICD-10-CM

## 2025-03-07 PROCEDURE — ? INVENTORY

## 2025-03-07 PROCEDURE — ? IN-HOUSE DISPENSING PHARMACY

## 2025-03-07 NOTE — PROCEDURE: IN-HOUSE DISPENSING PHARMACY
Name Of Product 26: Psoriasis Cream\\n(Calcipotriene 0.005%, Niacinamide 4%)
Product 38 Units Dispensed: 0
Product 21 Application Directions: Apply sparingly topically to the affected areas 1-2 times a day M-F, taper as directed.
Product 1 Amount/Unit (Numbers Only): 30
Product 51 Application Directions: Brighten the skin and exfoliate. For anti-aging or acne prone skin. Use 2-4 nights a week after cleansing and before serums/creams. Rinse off.
Name Of Product 11: Anti-Fungal Cream\\n(Ciclopirox 3%, Itraconazole 5%, Urea 20%, DMSO 5%)
Product 42 Unit Type: mg
Name Of Product 31: Wart Corn and Callus Cream\\n(Cimetidine 10%, Lidocaine 5%, Salicylic Acid 40%)
Product 21 Unit Type: grams
Product 51 Unit Type: unit(s)
Product 53 Application Directions: Improves the appearance of dark spots, melasma, hyperpigmentation, sun damage. Use daily, morning or evening.
Product 24 Price/Unit (In Dollars): 50
Name Of Product 22: Dermatitis Cream\\n(Clobetasol Propionate 0.05%, Niacinamide 4%)
Product 11 Application Directions: Apply sparingly topically to the affected areas 1-2 times daily as directed.
Product 31 Application Directions: Apply topically to the affected areas 1-2 times daily as directed.
Name Of Product 52: Antioxidant C Serum
Detail Level: Zone
Product 53 Unit Type: bottle(s)
Product 24 Amount/Unit (Numbers Only): 60
Name Of Product 54: Hyaluronic Acid Gel
Send Charges To Patient Encounter: No
Product 52 Application Directions: Brightens the skin adding a healthy glow. Protects from pollution and environmental damage. Use daily, morning or evening.
Product 2 Amount/Unit (Numbers Only): 120
Name Of Product 12: Anti-Fungal Nail Solution\\n(Fluconazole 4%, Ibuprofen 2%, Itraconazole 1%, Terbinafine HCL 4%)
Name Of Product 32: Actinic Keratosis Gel / Wart Gel\\n(Imiquimod 5%, Niacinamide 2%, Levocetirizine Dihydrochloride 1%)
Product 52 Amount/Unit (Numbers Only): 1
Name Of Product 25: Dermatitis Foam\\n(Clobetasol Propionate 0.05%, Calcipotriene 0.005%)
Name Of Product 3: Rosacea Silicone Gel\\n(Metronidazole 1%, Ivermectin 1%, Potassium Azeloyl Diglycinate 5%, Niacinamide 4%)
Product 12 Amount/Unit (Numbers Only): 15
Product 25 Application Directions: Apply sparingly topically to the affected areas 1-2 times daily, taper as directed.
Name Of Product 51: AHA + BHA Peel Pads
Product 26 Application Directions: Apply sparingly to all affected areas as directed 1-2 times per day.
Name Of Product 24: Dermatitis Topical Solution\\n(Clobetasol Propionate 0.05%, Niacinamide 4%)
Name Of Product 2: Acne Body Wash\\n(Salicylic Acid 5%, Sodium Sulfacetamide Monohydrate 10%, Sulfur Precipitated 2.75%)
Name Of Product 41: Alopecia Topical Solution for Men HP\\n(Minoxidil 7%, Finasteride 0.1%)
Product 24 Application Directions: Patient is to mix with 14 ounces of Skin MD.\\nApply sparingly topically to the affected areas 5-10 times per week, taper as directed.
Product 2 Application Directions: Wash affected areas daily or as directed.
Product 41 Application Directions: Apply topically to the affected areas 1 or 2 times daily or as directed.
Name Of Product 21: Dermatitis Cream\\n(Fluocinolone Acetonide 0.01%, Niacinamide 4%)
Render Refills If Set To 0: Yes
Product 54 Application Directions: Oil-free moisturizing gel. Good for plumping skin with wrinkles, crepiness and uneven texture. Use daily, morning or evening.
Name Of Product 23: Dermatitis Topical Solution\\n(Clobetasol Propionate 0.05%, Niacinamide 4%
Product 12 Application Directions: Apply topically to the affected nail(s) and surrounding areas twice daily as directed.
Product 32 Application Directions: Apply sparingly topically to the affected areas daily as directed.
Name Of Product 53: Damon
Name Of Product 1: Acne Gel\\n(Adapalene 0.3%, Benzoyl Peroxide 2.5%, Niacinamide 4%)
Product 23 Application Directions: Apply sparingly topically to the affected areas 3 times per week, taper as directed.
Product 1 Application Directions: Apply sparingly topically to acne affected zones daily as directed.

## 2025-03-25 ENCOUNTER — APPOINTMENT (OUTPATIENT)
Dept: URBAN - NONMETROPOLITAN AREA CLINIC 9 | Facility: CLINIC | Age: 20
Setting detail: DERMATOLOGY
End: 2025-03-25

## 2025-03-25 DIAGNOSIS — L70.0 ACNE VULGARIS: ICD-10-CM

## 2025-03-25 PROCEDURE — ? IN-HOUSE DISPENSING PHARMACY

## 2025-03-25 PROCEDURE — ? INVENTORY

## 2025-03-25 NOTE — PROCEDURE: IN-HOUSE DISPENSING PHARMACY
Product 27 Refills: 0
Product 17 Unit Type: mg
Product 2 Unit Type: grams
Product 51 Unit Type: unit(s)
Product 11 Price/Unit (In Dollars): 50
Name Of Product 3: Rosacea Silicone Gel\\n(Metronidazole 1%, Ivermectin 1%, Potassium Azeloyl Diglycinate 5%, Niacinamide 4%)
Name Of Product 52: Antioxidant C Serum
Name Of Product 21: Dermatitis Cream\\n(Fluocinolone Acetonide 0.01%, Niacinamide 4%)
Product 25 Application Directions: Apply sparingly topically to the affected areas 1-2 times daily, taper as directed.
Product 11 Amount/Unit (Numbers Only): 30
Product 54 Amount/Unit (Numbers Only): 1
Product 21 Application Directions: Apply sparingly topically to the affected areas 1-2 times a day M-F, taper as directed.
Product 23 Amount/Unit (Numbers Only): 60
Name Of Product 41: Alopecia Topical Solution for Men HP\\n(Minoxidil 7%, Finasteride 0.1%)
Name Of Product 26: Psoriasis Cream\\n(Calcipotriene 0.005%, Niacinamide 4%)
Product 52 Application Directions: Brightens the skin adding a healthy glow. Protects from pollution and environmental damage. Use daily, morning or evening.
Name Of Product 24: Dermatitis Topical Solution\\n(Clobetasol Propionate 0.05%, Niacinamide 4%)
Product 3 Application Directions: Apply sparingly topically to the affected areas 1-2 times daily as directed.
Product 12 Application Directions: Apply topically to the affected nail(s) and surrounding areas twice daily as directed.
Name Of Product 2: Acne Body Wash\\n(Salicylic Acid 5%, Sodium Sulfacetamide Monohydrate 10%, Sulfur Precipitated 2.75%)
Product 24 Application Directions: Patient is to mix with 14 ounces of Skin MD.\\nApply sparingly topically to the affected areas 5-10 times per week, taper as directed.
Product 2 Application Directions: Wash affected areas daily or as directed.
Name Of Product 25: Dermatitis Foam\\n(Clobetasol Propionate 0.05%, Calcipotriene 0.005%)
Product 51 Application Directions: Brighten the skin and exfoliate. For anti-aging or acne prone skin. Use 2-4 nights a week after cleansing and before serums/creams. Rinse off.
Product 53 Unit Type: bottle(s)
Render Product Pricing In Note: No
Name Of Product 51: AHA + BHA Peel Pads
Name Of Product 11: Anti-Fungal Cream\\n(Ciclopirox 3%, Itraconazole 5%, Urea 20%, DMSO 5%)
Name Of Product 32: Actinic Keratosis Gel / Wart Gel\\n(Imiquimod 5%, Niacinamide 2%, Levocetirizine Dihydrochloride 1%)
Name Of Product 23: Dermatitis Topical Solution\\n(Clobetasol Propionate 0.05%, Niacinamide 4%
Name Of Product 54: Hyaluronic Acid Gel
Render Refills If Set To 0: Yes
Product 32 Application Directions: Apply sparingly topically to the affected areas daily as directed.
Name Of Product 1: Acne Gel\\n(Adapalene 0.3%, Benzoyl Peroxide 2.5%, Niacinamide 4%)
Product 23 Application Directions: Apply sparingly topically to the affected areas 3 times per week, taper as directed.
Product 54 Application Directions: Oil-free moisturizing gel. Good for plumping skin with wrinkles, crepiness and uneven texture. Use daily, morning or evening.
Name Of Product 12: Anti-Fungal Nail Solution\\n(Fluconazole 4%, Ibuprofen 2%, Itraconazole 1%, Terbinafine HCL 4%)
Product 1 Application Directions: Apply sparingly topically to acne affected zones daily as directed.
Name Of Product 22: Dermatitis Cream\\n(Clobetasol Propionate 0.05%, Niacinamide 4%)
Name Of Product 53: Damon
Product 41 Application Directions: Apply topically to the affected areas 1 or 2 times daily or as directed.
Name Of Product 31: Wart Corn and Callus Cream\\n(Cimetidine 10%, Lidocaine 5%, Salicylic Acid 40%)
Product 26 Application Directions: Apply sparingly to all affected areas as directed 1-2 times per day.
Product 12 Amount/Unit (Numbers Only): 15
Product 31 Application Directions: Apply topically to the affected areas 1-2 times daily as directed.
Detail Level: Zone
Product 53 Application Directions: Improves the appearance of dark spots, melasma, hyperpigmentation, sun damage. Use daily, morning or evening.
Product 2 Amount/Unit (Numbers Only): 120

## 2025-04-29 ENCOUNTER — APPOINTMENT (OUTPATIENT)
Dept: URBAN - NONMETROPOLITAN AREA CLINIC 9 | Facility: CLINIC | Age: 20
Setting detail: DERMATOLOGY
End: 2025-04-29

## 2025-04-29 DIAGNOSIS — L70.0 ACNE VULGARIS: ICD-10-CM

## 2025-04-29 PROCEDURE — ? INVENTORY

## 2025-04-29 PROCEDURE — ? IN-HOUSE DISPENSING PHARMACY

## 2025-04-29 NOTE — PROCEDURE: IN-HOUSE DISPENSING PHARMACY
Product 25 Price/Unit (In Dollars): 50
Name Of Product 23: Dermatitis Topical Solution\\n(Clobetasol Propionate 0.05%, Niacinamide 4%
Product 80 Price/Unit (In Dollars): 0
Product 15 Unit Type: mg
Render Refills If Set To 0: Yes
Product 2 Application Directions: Wash affected areas daily or as directed.
Name Of Product 32: Actinic Keratosis Gel / Wart Gel\\n(Imiquimod 5%, Niacinamide 2%, Levocetirizine Dihydrochloride 1%)
Name Of Product 1: Acne Gel\\n(Adapalene 0.3%, Benzoyl Peroxide 2.5%, Niacinamide 4%)
Product 54 Application Directions: Oil-free moisturizing gel. Good for plumping skin with wrinkles, crepiness and uneven texture. Use daily, morning or evening.
Product 11 Application Directions: Apply sparingly topically to the affected areas 1-2 times daily as directed.
Product 2 Unit Type: grams
Product 25 Amount/Unit (Numbers Only): 60
Product 23 Application Directions: Apply sparingly topically to the affected areas 3 times per week, taper as directed.
Product 54 Unit Type: bottle(s)
Product 52 Amount/Unit (Numbers Only): 1
Name Of Product 12: Anti-Fungal Nail Solution\\n(Fluconazole 4%, Ibuprofen 2%, Itraconazole 1%, Terbinafine HCL 4%)
Product 32 Application Directions: Apply sparingly topically to the affected areas daily as directed.
Name Of Product 3: Rosacea Silicone Gel\\n(Metronidazole 1%, Ivermectin 1%, Potassium Azeloyl Diglycinate 5%, Niacinamide 4%)
Product 1 Application Directions: Apply sparingly topically to acne affected zones daily as directed.
Product 21 Amount/Unit (Numbers Only): 30
Name Of Product 24: Dermatitis Topical Solution\\n(Clobetasol Propionate 0.05%, Niacinamide 4%)
Name Of Product 22: Dermatitis Cream\\n(Clobetasol Propionate 0.05%, Niacinamide 4%)
Product 12 Amount/Unit (Numbers Only): 15
Product 41 Application Directions: Apply topically to the affected areas 1 or 2 times daily or as directed.
Product 26 Application Directions: Apply sparingly to all affected areas as directed 1-2 times per day.
Name Of Product 31: Wart Corn and Callus Cream\\n(Cimetidine 10%, Lidocaine 5%, Salicylic Acid 40%)
Detail Level: Zone
Product 53 Application Directions: Improves the appearance of dark spots, melasma, hyperpigmentation, sun damage. Use daily, morning or evening.
Send Charges To Patient Encounter: No
Product 22 Application Directions: Apply sparingly topically to the affected areas 1-2 times a day M-F, taper as directed.
Product 31 Application Directions: Apply topically to the affected areas 1-2 times daily as directed.
Name Of Product 11: Anti-Fungal Cream\\n(Ciclopirox 3%, Itraconazole 5%, Urea 20%, DMSO 5%)
Name Of Product 54: Hyaluronic Acid Gel
Name Of Product 52: Antioxidant C Serum
Name Of Product 21: Dermatitis Cream\\n(Fluocinolone Acetonide 0.01%, Niacinamide 4%)
Product 2 Amount/Unit (Numbers Only): 120
Product 25 Application Directions: Apply sparingly topically to the affected areas 1-2 times daily, taper as directed.
Product 52 Application Directions: Brightens the skin adding a healthy glow. Protects from pollution and environmental damage. Use daily, morning or evening.
Name Of Product 41: Alopecia Topical Solution for Men HP\\n(Minoxidil 7%, Finasteride 0.1%)
Name Of Product 26: Psoriasis Cream\\n(Calcipotriene 0.005%, Niacinamide 4%)
Name Of Product 53: Damon
Name Of Product 2: Acne Body Wash\\n(Salicylic Acid 5%, Sodium Sulfacetamide Monohydrate 10%, Sulfur Precipitated 2.75%)
Product 12 Application Directions: Apply topically to the affected nail(s) and surrounding areas twice daily as directed.
Name Of Product 51: AHA + BHA Peel Pads
Product 24 Application Directions: Patient is to mix with 14 ounces of Skin MD.\\nApply sparingly topically to the affected areas 5-10 times per week, taper as directed.
Product 51 Application Directions: Brighten the skin and exfoliate. For anti-aging or acne prone skin. Use 2-4 nights a week after cleansing and before serums/creams. Rinse off.
Name Of Product 25: Dermatitis Foam\\n(Clobetasol Propionate 0.05%, Calcipotriene 0.005%)
Product 51 Unit Type: unit(s)

## 2025-05-27 ENCOUNTER — APPOINTMENT (OUTPATIENT)
Dept: URBAN - NONMETROPOLITAN AREA CLINIC 9 | Facility: CLINIC | Age: 20
Setting detail: DERMATOLOGY
End: 2025-05-27

## 2025-05-27 DIAGNOSIS — L70.0 ACNE VULGARIS: ICD-10-CM

## 2025-05-27 PROCEDURE — ? INVENTORY

## 2025-05-27 PROCEDURE — ? IN-HOUSE DISPENSING PHARMACY

## 2025-05-27 NOTE — PROCEDURE: IN-HOUSE DISPENSING PHARMACY
Product 2 Refills: 0
Product 28 Unit Type: grams
Product 31 Application Directions: Apply topically to the affected areas 1-2 times daily as directed.
Product 19 Unit Type: mg
Send Charges To Patient Encounter: No
Product 26 Amount/Unit (Numbers Only): 30
Product 22 Price/Unit (In Dollars): 50
Product 24 Application Directions: Patient is to mix with 14 ounces of Skin MD.\\nApply sparingly topically to the affected areas 5-10 times per week, taper as directed.
Product 53 Amount/Unit (Numbers Only): 1
Product 2 Amount/Unit (Numbers Only): 120
Product 51 Application Directions: Brighten the skin and exfoliate. For anti-aging or acne prone skin. Use 2-4 nights a week after cleansing and before serums/creams. Rinse off.
Name Of Product 41: Alopecia Topical Solution for Men HP\\n(Minoxidil 7%, Finasteride 0.1%)
Name Of Product 32: Actinic Keratosis Gel / Wart Gel\\n(Imiquimod 5%, Niacinamide 2%, Levocetirizine Dihydrochloride 1%)
Render Refills If Set To 0: Yes
Product 22 Amount/Unit (Numbers Only): 60
Product 51 Unit Type: unit(s)
Name Of Product 25: Dermatitis Foam\\n(Clobetasol Propionate 0.05%, Calcipotriene 0.005%)
Name Of Product 52: Antioxidant C Serum
Name Of Product 1: Acne Gel\\n(Adapalene 0.3%, Benzoyl Peroxide 2.5%, Niacinamide 4%)
Product 32 Application Directions: Apply sparingly topically to the affected areas daily as directed.
Product 54 Application Directions: Oil-free moisturizing gel. Good for plumping skin with wrinkles, crepiness and uneven texture. Use daily, morning or evening.
Product 12 Application Directions: Apply topically to the affected nail(s) and surrounding areas twice daily as directed.
Product 3 Application Directions: Apply sparingly topically to the affected areas 1-2 times daily as directed.
Product 54 Unit Type: bottle(s)
Product 23 Application Directions: Apply sparingly topically to the affected areas 3 times per week, taper as directed.
Name Of Product 31: Wart Corn and Callus Cream\\n(Cimetidine 10%, Lidocaine 5%, Salicylic Acid 40%)
Name Of Product 24: Dermatitis Topical Solution\\n(Clobetasol Propionate 0.05%, Niacinamide 4%)
Name Of Product 51: AHA + BHA Peel Pads
Detail Level: Zone
Product 26 Application Directions: Apply sparingly to all affected areas as directed 1-2 times per day.
Name Of Product 22: Dermatitis Cream\\n(Clobetasol Propionate 0.05%, Niacinamide 4%)
Product 2 Application Directions: Wash affected areas daily or as directed.
Product 53 Application Directions: Improves the appearance of dark spots, melasma, hyperpigmentation, sun damage. Use daily, morning or evening.
Product 22 Application Directions: Apply sparingly topically to the affected areas 1-2 times a day M-F, taper as directed.
Name Of Product 3: Rosacea Silicone Gel\\n(Metronidazole 1%, Ivermectin 1%, Potassium Azeloyl Diglycinate 5%, Niacinamide 4%)
Name Of Product 12: Anti-Fungal Nail Solution\\n(Fluconazole 4%, Ibuprofen 2%, Itraconazole 1%, Terbinafine HCL 4%)
Name Of Product 54: Hyaluronic Acid Gel
Name Of Product 23: Dermatitis Topical Solution\\n(Clobetasol Propionate 0.05%, Niacinamide 4%
Product 41 Application Directions: Apply topically to the affected areas 1 or 2 times daily or as directed.
Product 12 Amount/Unit (Numbers Only): 15
Product 25 Application Directions: Apply sparingly topically to the affected areas 1-2 times daily, taper as directed.
Name Of Product 21: Dermatitis Cream\\n(Fluocinolone Acetonide 0.01%, Niacinamide 4%)
Product 1 Application Directions: Apply sparingly topically to acne affected zones daily as directed.
Product 52 Application Directions: Brightens the skin adding a healthy glow. Protects from pollution and environmental damage. Use daily, morning or evening.
Name Of Product 26: Psoriasis Cream\\n(Calcipotriene 0.005%, Niacinamide 4%)
Name Of Product 53: Damon
Name Of Product 2: Acne Body Wash\\n(Salicylic Acid 5%, Sodium Sulfacetamide Monohydrate 10%, Sulfur Precipitated 2.75%)
Name Of Product 11: Anti-Fungal Cream\\n(Ciclopirox 3%, Itraconazole 5%, Urea 20%, DMSO 5%)

## 2025-06-25 ENCOUNTER — APPOINTMENT (OUTPATIENT)
Dept: URBAN - NONMETROPOLITAN AREA CLINIC 9 | Facility: CLINIC | Age: 20
Setting detail: DERMATOLOGY
End: 2025-06-25

## 2025-06-25 DIAGNOSIS — L70.0 ACNE VULGARIS: ICD-10-CM

## 2025-06-25 PROCEDURE — ? IN-HOUSE DISPENSING PHARMACY

## 2025-06-25 PROCEDURE — ? INVENTORY

## 2025-06-25 NOTE — PROCEDURE: IN-HOUSE DISPENSING PHARMACY
Product 56 Units Dispensed: 0
Product 60 Unit Type: mg
Product 25 Application Directions: Apply sparingly topically to the affected areas 1-2 times daily, taper as directed.
Name Of Product 21: Dermatitis Cream\\n(Fluocinolone Acetonide 0.01%, Niacinamide 4%)
Product 41 Application Directions: Apply topically to the affected areas 1 or 2 times daily or as directed.
Product 1 Application Directions: Apply sparingly topically to acne affected zones daily as directed.
Product 32 Unit Type: grams
Name Of Product 52: Antioxidant C Serum
Name Of Product 2: Acne Body Wash\\n(Salicylic Acid 5%, Sodium Sulfacetamide Monohydrate 10%, Sulfur Precipitated 2.75%)
Name Of Product 11: Anti-Fungal Cream\\n(Ciclopirox 3%, Itraconazole 5%, Urea 20%, DMSO 5%)
Product 23 Amount/Unit (Numbers Only): 60
Product 54 Amount/Unit (Numbers Only): 1
Name Of Product 26: Psoriasis Cream\\n(Calcipotriene 0.005%, Niacinamide 4%)
Product 21 Application Directions: Apply sparingly topically to the affected areas 1-2 times a day M-F, taper as directed.
Product 52 Application Directions: Brightens the skin adding a healthy glow. Protects from pollution and environmental damage. Use daily, morning or evening.
Product 52 Unit Type: bottle(s)
Product 24 Price/Unit (In Dollars): 50
Name Of Product 31: Wart Corn and Callus Cream\\n(Cimetidine 10%, Lidocaine 5%, Salicylic Acid 40%)
Product 26 Application Directions: Apply sparingly to all affected areas as directed 1-2 times per day.
Name Of Product 22: Dermatitis Cream\\n(Clobetasol Propionate 0.05%, Niacinamide 4%)
Product 11 Application Directions: Apply sparingly topically to the affected areas 1-2 times daily as directed.
Product 2 Application Directions: Wash affected areas daily or as directed.
Product 26 Amount/Unit (Numbers Only): 30
Product 2 Amount/Unit (Numbers Only): 120
Product 24 Application Directions: Patient is to mix with 14 ounces of Skin MD.\\nApply sparingly topically to the affected areas 5-10 times per week, taper as directed.
Render Refills If Set To 0: Yes
Name Of Product 51: AHA + BHA Peel Pads
Name Of Product 25: Dermatitis Foam\\n(Clobetasol Propionate 0.05%, Calcipotriene 0.005%)
Name Of Product 41: Alopecia Topical Solution for Men HP\\n(Minoxidil 7%, Finasteride 0.1%)
Name Of Product 1: Acne Gel\\n(Adapalene 0.3%, Benzoyl Peroxide 2.5%, Niacinamide 4%)
Product 51 Application Directions: Brighten the skin and exfoliate. For anti-aging or acne prone skin. Use 2-4 nights a week after cleansing and before serums/creams. Rinse off.
Product 12 Amount/Unit (Numbers Only): 15
Product 51 Unit Type: unit(s)
Product 23 Application Directions: Apply sparingly topically to the affected areas 3 times per week, taper as directed.
Product 54 Application Directions: Oil-free moisturizing gel. Good for plumping skin with wrinkles, crepiness and uneven texture. Use daily, morning or evening.
Product 32 Application Directions: Apply sparingly topically to the affected areas daily as directed.
Name Of Product 24: Dermatitis Topical Solution\\n(Clobetasol Propionate 0.05%, Niacinamide 4%)
Name Of Product 53: Damon
Detail Level: Zone
Send Charges To Patient Encounter: No
Name Of Product 3: Rosacea Silicone Gel\\n(Metronidazole 1%, Ivermectin 1%, Potassium Azeloyl Diglycinate 5%, Niacinamide 4%)
Name Of Product 12: Anti-Fungal Nail Solution\\n(Fluconazole 4%, Ibuprofen 2%, Itraconazole 1%, Terbinafine HCL 4%)
Product 31 Application Directions: Apply topically to the affected areas 1-2 times daily as directed.
Product 53 Application Directions: Improves the appearance of dark spots, melasma, hyperpigmentation, sun damage. Use daily, morning or evening.
Name Of Product 23: Dermatitis Topical Solution\\n(Clobetasol Propionate 0.05%, Niacinamide 4%
Product 12 Application Directions: Apply topically to the affected nail(s) and surrounding areas twice daily as directed.
Name Of Product 54: Hyaluronic Acid Gel
Name Of Product 32: Actinic Keratosis Gel / Wart Gel\\n(Imiquimod 5%, Niacinamide 2%, Levocetirizine Dihydrochloride 1%)

## 2025-07-08 ENCOUNTER — APPOINTMENT (OUTPATIENT)
Dept: URBAN - NONMETROPOLITAN AREA CLINIC 9 | Facility: CLINIC | Age: 20
Setting detail: DERMATOLOGY
End: 2025-07-08

## 2025-07-08 DIAGNOSIS — L72.8 OTHER FOLLICULAR CYSTS OF THE SKIN AND SUBCUTANEOUS TISSUE: ICD-10-CM

## 2025-07-08 DIAGNOSIS — L70.0 ACNE VULGARIS: ICD-10-CM

## 2025-07-08 PROCEDURE — ? TREATMENT REGIMEN

## 2025-07-08 PROCEDURE — ? PRESCRIPTION

## 2025-07-08 PROCEDURE — ? COUNSELING

## 2025-07-08 PROCEDURE — ? OBSERVATION

## 2025-07-08 RX ORDER — MINOCYCLINE HYDROCHLORIDE 100 MG/1
CAPSULE ORAL
Qty: 60 | Refills: 5 | Status: ERX

## 2025-07-08 ASSESSMENT — LOCATION SIMPLE DESCRIPTION DERM
LOCATION SIMPLE: CHIN
LOCATION SIMPLE: LEFT SHOULDER
LOCATION SIMPLE: LEFT CHEEK
LOCATION SIMPLE: RIGHT CHEEK
LOCATION SIMPLE: RIGHT SHOULDER

## 2025-07-08 ASSESSMENT — LOCATION DETAILED DESCRIPTION DERM
LOCATION DETAILED: LEFT LATERAL MALAR CHEEK
LOCATION DETAILED: RIGHT ANTERIOR SHOULDER
LOCATION DETAILED: RIGHT CENTRAL MALAR CHEEK
LOCATION DETAILED: LEFT SUPERIOR CENTRAL MALAR CHEEK
LOCATION DETAILED: LEFT CHIN
LOCATION DETAILED: LEFT ANTERIOR SHOULDER

## 2025-07-08 ASSESSMENT — LOCATION ZONE DERM
LOCATION ZONE: ARM
LOCATION ZONE: FACE

## 2025-07-08 NOTE — PROCEDURE: COUNSELING
Detail Level: Detailed
Bactrim Pregnancy And Lactation Text: This medication is Pregnancy Category D and is known to cause fetal risk.  It is also excreted in breast milk.
Erythromycin Counseling:  I discussed with the patient the risks of erythromycin including but not limited to GI upset, allergic reaction, drug rash, diarrhea, increase in liver enzymes, and yeast infections.
Aklief counseling:  Patient advised to apply a pea-sized amount only at bedtime and wait 30 minutes after washing their face before applying.  If too drying, patient may add a non-comedogenic moisturizer.  The most commonly reported side effects including irritation, redness, scaling, dryness, stinging, burning, itching, and increased risk of sunburn.  The patient verbalized understanding of the proper use and possible adverse effects of retinoids.  All of the patient's questions and concerns were addressed.
Winlevi Counseling:  I discussed with the patient the risks of topical clascoterone including but not limited to erythema, scaling, itching, and stinging. Patient voiced their understanding.
Topical Retinoid Pregnancy And Lactation Text: This medication is Pregnancy Category C. It is unknown if this medication is excreted in breast milk.
Tazorac Pregnancy And Lactation Text: This medication is not safe during pregnancy. It is unknown if this medication is excreted in breast milk.
Include Pregnancy/Lactation Warning?: No
Birth Control Pills Pregnancy And Lactation Text: This medication should be avoided if pregnant and for the first 30 days post-partum.
Isotretinoin Counseling: Patient should get monthly blood tests, not donate blood, not drive at night if vision affected, not share medication, and not undergo elective surgery for 6 months after tx completed. Side effects reviewed, pt to contact office should one occur.
Sarecycline Pregnancy And Lactation Text: This medication is Pregnancy Category D and not consider safe during pregnancy. It is also excreted in breast milk.
Azelaic Acid Counseling: Patient counseled that medicine may cause skin irritation and to avoid applying near the eyes.  In the event of skin irritation, the patient was advised to reduce the amount of the drug applied or use it less frequently.   The patient verbalized understanding of the proper use and possible adverse effects of azelaic acid.  All of the patient's questions and concerns were addressed.
Isotretinoin Pregnancy And Lactation Text: This medication is Pregnancy Category X and is considered extremely dangerous during pregnancy. It is unknown if it is excreted in breast milk.
Spironolactone Counseling: Patient advised regarding risks of diarrhea, abdominal pain, hyperkalemia, birth defects (for female patients), liver toxicity and renal toxicity. The patient may need blood work to monitor liver and kidney function and potassium levels while on therapy. The patient verbalized understanding of the proper use and possible adverse effects of spironolactone.  All of the patient's questions and concerns were addressed.
Azelaic Acid Pregnancy And Lactation Text: This medication is considered safe during pregnancy and breast feeding.
Topical Clindamycin Counseling: Patient counseled that this medication may cause skin irritation or allergic reactions.  In the event of skin irritation, the patient was advised to reduce the amount of the drug applied or use it less frequently.   The patient verbalized understanding of the proper use and possible adverse effects of clindamycin.  All of the patient's questions and concerns were addressed.
Dapsone Counseling: I discussed with the patient the risks of dapsone including but not limited to hemolytic anemia, agranulocytosis, rashes, methemoglobinemia, kidney failure, peripheral neuropathy, headaches, GI upset, and liver toxicity.  Patients who start dapsone require monitoring including baseline LFTs and weekly CBCs for the first month, then every month thereafter.  The patient verbalized understanding of the proper use and possible adverse effects of dapsone.  All of the patient's questions and concerns were addressed.
Topical Sulfur Applications Counseling: Topical Sulfur Counseling: Patient counseled that this medication may cause skin irritation or allergic reactions.  In the event of skin irritation, the patient was advised to reduce the amount of the drug applied or use it less frequently.   The patient verbalized understanding of the proper use and possible adverse effects of topical sulfur application.  All of the patient's questions and concerns were addressed.
Benzoyl Peroxide Pregnancy And Lactation Text: This medication is Pregnancy Category C. It is unknown if benzoyl peroxide is excreted in breast milk.
Azithromycin Pregnancy And Lactation Text: This medication is considered safe during pregnancy and is also secreted in breast milk.
Doxycycline Counseling:  Patient counseled regarding possible photosensitivity and increased risk for sunburn.  Patient instructed to avoid sunlight, if possible.  When exposed to sunlight, patients should wear protective clothing, sunglasses, and sunscreen.  The patient was instructed to call the office immediately if the following severe adverse effects occur:  hearing changes, easy bruising/bleeding, severe headache, or vision changes.  The patient verbalized understanding of the proper use and possible adverse effects of doxycycline.  All of the patient's questions and concerns were addressed.
High Dose Vitamin A Pregnancy And Lactation Text: High dose vitamin A therapy is contraindicated during pregnancy and breast feeding.
Tetracycline Counseling: Patient counseled regarding possible photosensitivity and increased risk for sunburn.  Patient instructed to avoid sunlight, if possible.  When exposed to sunlight, patients should wear protective clothing, sunglasses, and sunscreen.  The patient was instructed to call the office immediately if the following severe adverse effects occur:  hearing changes, easy bruising/bleeding, severe headache, or vision changes.  The patient verbalized understanding of the proper use and possible adverse effects of tetracycline.  All of the patient's questions and concerns were addressed. Patient understands to avoid pregnancy while on therapy due to potential birth defects.
Erythromycin Pregnancy And Lactation Text: This medication is Pregnancy Category B and is considered safe during pregnancy. It is also excreted in breast milk.
Sarecycline Counseling: Patient advised regarding possible photosensitivity and discoloration of the teeth, skin, lips, tongue and gums.  Patient instructed to avoid sunlight, if possible.  When exposed to sunlight, patients should wear protective clothing, sunglasses, and sunscreen.  The patient was instructed to call the office immediately if the following severe adverse effects occur:  hearing changes, easy bruising/bleeding, severe headache, or vision changes.  The patient verbalized understanding of the proper use and possible adverse effects of sarecycline.  All of the patient's questions and concerns were addressed.
Aklief Pregnancy And Lactation Text: It is unknown if this medication is safe to use during pregnancy.  It is unknown if this medication is excreted in breast milk.  Breastfeeding women should use the topical cream on the smallest area of the skin for the shortest time needed while breastfeeding.  Do not apply to nipple and areola.
Tazorac Counseling:  Patient advised that medication is irritating and drying.  Patient may need to apply sparingly and wash off after an hour before eventually leaving it on overnight.  The patient verbalized understanding of the proper use and possible adverse effects of tazorac.  All of the patient's questions and concerns were addressed.
Doxycycline Pregnancy And Lactation Text: This medication is Pregnancy Category D and not consider safe during pregnancy. It is also excreted in breast milk but is considered safe for shorter treatment courses.
Bactrim Counseling:  I discussed with the patient the risks of sulfa antibiotics including but not limited to GI upset, allergic reaction, drug rash, diarrhea, dizziness, photosensitivity, and yeast infections.  Rarely, more serious reactions can occur including but not limited to aplastic anemia, agranulocytosis, methemoglobinemia, blood dyscrasias, liver or kidney failure, lung infiltrates or desquamative/blistering drug rashes.
Minocycline Counseling: Patient advised regarding possible photosensitivity and discoloration of the teeth, skin, lips, tongue and gums.  Patient instructed to avoid sunlight, if possible.  When exposed to sunlight, patients should wear protective clothing, sunglasses, and sunscreen.  The patient was instructed to call the office immediately if the following severe adverse effects occur:  hearing changes, easy bruising/bleeding, severe headache, or vision changes.  The patient verbalized understanding of the proper use and possible adverse effects of minocycline.  All of the patient's questions and concerns were addressed.
Topical Retinoid counseling:  Patient advised to apply a pea-sized amount only at bedtime and wait 30 minutes after washing their face before applying.  If too drying, patient may add a non-comedogenic moisturizer. The patient verbalized understanding of the proper use and possible adverse effects of retinoids.  All of the patient's questions and concerns were addressed.
Birth Control Pills Counseling: Birth Control Pill Counseling: I discussed with the patient the potential side effects of OCPs including but not limited to increased risk of stroke, heart attack, thrombophlebitis, deep venous thrombosis, hepatic adenomas, breast changes, GI upset, headaches, and depression.  The patient verbalized understanding of the proper use and possible adverse effects of OCPs. All of the patient's questions and concerns were addressed.
Winlevi Pregnancy And Lactation Text: This medication is considered safe during pregnancy and breastfeeding.
Azithromycin Counseling:  I discussed with the patient the risks of azithromycin including but not limited to GI upset, allergic reaction, drug rash, diarrhea, and yeast infections.
Dapsone Pregnancy And Lactation Text: This medication is Pregnancy Category C and is not considered safe during pregnancy or breast feeding.
High Dose Vitamin A Counseling: Side effects reviewed, pt to contact office should one occur.
Spironolactone Pregnancy And Lactation Text: This medication can cause feminization of the male fetus and should be avoided during pregnancy. The active metabolite is also found in breast milk.
Topical Sulfur Applications Pregnancy And Lactation Text: This medication is Pregnancy Category C and has an unknown safety profile during pregnancy. It is unknown if this topical medication is excreted in breast milk.
Topical Clindamycin Pregnancy And Lactation Text: This medication is Pregnancy Category B and is considered safe during pregnancy. It is unknown if it is excreted in breast milk.
Benzoyl Peroxide Counseling: Patient counseled that medicine may cause skin irritation and bleach clothing.  In the event of skin irritation, the patient was advised to reduce the amount of the drug applied or use it less frequently.   The patient verbalized understanding of the proper use and possible adverse effects of benzoyl peroxide.  All of the patient's questions and concerns were addressed.

## 2025-08-15 ENCOUNTER — APPOINTMENT (OUTPATIENT)
Dept: URBAN - NONMETROPOLITAN AREA CLINIC 9 | Facility: CLINIC | Age: 20
Setting detail: DERMATOLOGY
End: 2025-08-15

## 2025-08-15 DIAGNOSIS — L70.0 ACNE VULGARIS: ICD-10-CM

## 2025-08-15 PROCEDURE — ? INVENTORY

## 2025-08-15 PROCEDURE — ? IN-HOUSE DISPENSING PHARMACY
